# Patient Record
Sex: FEMALE | Race: WHITE | Employment: OTHER | URBAN - METROPOLITAN AREA
[De-identification: names, ages, dates, MRNs, and addresses within clinical notes are randomized per-mention and may not be internally consistent; named-entity substitution may affect disease eponyms.]

---

## 2018-05-17 NOTE — PERIOP NOTES
Called patient with directions to [facility] and our phone number. Patient states no blood transfusions in the last 90 days. She agreed to change the 5/21 PAT appointment to 5/22/18.

## 2018-05-22 ENCOUNTER — HOSPITAL ENCOUNTER (OUTPATIENT)
Dept: SURGERY | Age: 73
Discharge: HOME | DRG: 554 | End: 2018-05-22
Admitting: ORTHOPAEDIC SURGERY
Payer: MEDICARE

## 2018-05-22 VITALS — WEIGHT: 154 LBS | HEIGHT: 61 IN | BODY MASS INDEX: 29.07 KG/M2

## 2018-05-22 LAB
ABO + RH BLD: NORMAL
ALBUMIN SERPL-MCNC: 3.6 G/DL (ref 3.5–5)
ALBUMIN/GLOB SERPL: 0.9 {RATIO} (ref 1.1–2.2)
ALP SERPL-CCNC: 93 U/L (ref 45–117)
ALT SERPL-CCNC: 17 U/L (ref 12–78)
ANION GAP SERPL CALC-SCNC: 4 MMOL/L (ref 5–15)
APPEARANCE UR: CLEAR
AST SERPL-CCNC: 11 U/L (ref 15–37)
BACTERIA URNS QL MICRO: NEGATIVE /HPF
BILIRUB SERPL-MCNC: 0.4 MG/DL (ref 0.2–1)
BILIRUB UR QL: NEGATIVE
BLOOD GROUP ANTIBODIES SERPL: NORMAL
BUN SERPL-MCNC: 24 MG/DL (ref 6–20)
BUN/CREAT SERPL: 16 (ref 12–20)
CALCIUM SERPL-MCNC: 9.1 MG/DL (ref 8.5–10.1)
CHLORIDE SERPL-SCNC: 105 MMOL/L (ref 97–108)
CO2 SERPL-SCNC: 29 MMOL/L (ref 21–32)
COLOR UR: NORMAL
CREAT SERPL-MCNC: 1.46 MG/DL (ref 0.55–1.02)
EPITH CASTS URNS QL MICRO: NORMAL /LPF
ERYTHROCYTE [DISTWIDTH] IN BLOOD BY AUTOMATED COUNT: 13.4 % (ref 11.5–14.5)
EST. AVERAGE GLUCOSE BLD GHB EST-MCNC: 120 MG/DL
GLOBULIN SER CALC-MCNC: 4.2 G/DL (ref 2–4)
GLUCOSE SERPL-MCNC: 85 MG/DL (ref 65–100)
GLUCOSE UR STRIP.AUTO-MCNC: NEGATIVE MG/DL
HBA1C MFR BLD: 5.8 % (ref 4.2–6.3)
HCT VFR BLD AUTO: 38 % (ref 35–47)
HGB BLD-MCNC: 11.9 G/DL (ref 11.5–16)
HGB UR QL STRIP: NEGATIVE
HYALINE CASTS URNS QL MICRO: NORMAL /LPF (ref 0–5)
INR PPP: 1 (ref 0.9–1.1)
KETONES UR QL STRIP.AUTO: NEGATIVE MG/DL
LEUKOCYTE ESTERASE UR QL STRIP.AUTO: NEGATIVE
MCH RBC QN AUTO: 30.8 PG (ref 26–34)
MCHC RBC AUTO-ENTMCNC: 31.3 G/DL (ref 30–36.5)
MCV RBC AUTO: 98.4 FL (ref 80–99)
NITRITE UR QL STRIP.AUTO: NEGATIVE
NRBC # BLD: 0 K/UL (ref 0–0.01)
NRBC BLD-RTO: 0 PER 100 WBC
PH UR STRIP: 5 [PH] (ref 5–8)
PLATELET # BLD AUTO: 196 K/UL (ref 150–400)
PMV BLD AUTO: 9.3 FL (ref 8.9–12.9)
POTASSIUM SERPL-SCNC: 4.5 MMOL/L (ref 3.5–5.1)
PROT SERPL-MCNC: 7.8 G/DL (ref 6.4–8.2)
PROT UR STRIP-MCNC: NEGATIVE MG/DL
PROTHROMBIN TIME: 9.8 SEC (ref 9–11.1)
RBC # BLD AUTO: 3.86 M/UL (ref 3.8–5.2)
RBC #/AREA URNS HPF: NORMAL /HPF (ref 0–5)
SODIUM SERPL-SCNC: 138 MMOL/L (ref 136–145)
SP GR UR REFRACTOMETRY: 1.02 (ref 1–1.03)
SPECIMEN EXP DATE BLD: NORMAL
UA: UC IF INDICATED,UAUC: NORMAL
UROBILINOGEN UR QL STRIP.AUTO: 0.2 EU/DL (ref 0.2–1)
WBC # BLD AUTO: 6.9 K/UL (ref 3.6–11)
WBC URNS QL MICRO: NORMAL /HPF (ref 0–4)

## 2018-05-22 PROCEDURE — 85610 PROTHROMBIN TIME: CPT | Performed by: ORTHOPAEDIC SURGERY

## 2018-05-22 PROCEDURE — 83036 HEMOGLOBIN GLYCOSYLATED A1C: CPT | Performed by: ORTHOPAEDIC SURGERY

## 2018-05-22 PROCEDURE — 85027 COMPLETE CBC AUTOMATED: CPT | Performed by: ORTHOPAEDIC SURGERY

## 2018-05-22 PROCEDURE — 80053 COMPREHEN METABOLIC PANEL: CPT | Performed by: ORTHOPAEDIC SURGERY

## 2018-05-22 PROCEDURE — 81001 URINALYSIS AUTO W/SCOPE: CPT | Performed by: ORTHOPAEDIC SURGERY

## 2018-05-22 PROCEDURE — 36415 COLL VENOUS BLD VENIPUNCTURE: CPT | Performed by: ORTHOPAEDIC SURGERY

## 2018-05-22 PROCEDURE — 86900 BLOOD TYPING SEROLOGIC ABO: CPT | Performed by: ORTHOPAEDIC SURGERY

## 2018-05-22 RX ORDER — TRAMADOL HYDROCHLORIDE 50 MG/1
50 TABLET ORAL 2 TIMES DAILY
COMMUNITY
End: 2018-06-02

## 2018-05-22 RX ORDER — METFORMIN HYDROCHLORIDE 500 MG/1
500 TABLET ORAL
Status: ON HOLD | COMMUNITY
End: 2018-07-12

## 2018-05-22 RX ORDER — SERTRALINE HYDROCHLORIDE 100 MG/1
50 TABLET, FILM COATED ORAL 2 TIMES DAILY
COMMUNITY

## 2018-05-22 RX ORDER — HYDROCODONE BITARTRATE AND ACETAMINOPHEN 7.5; 325 MG/1; MG/1
1 TABLET ORAL
COMMUNITY
End: 2018-07-15

## 2018-05-22 RX ORDER — SIMVASTATIN 20 MG/1
20 TABLET, FILM COATED ORAL
COMMUNITY

## 2018-05-22 RX ORDER — MELOXICAM 15 MG/1
15 TABLET ORAL DAILY
Status: ON HOLD | COMMUNITY
End: 2018-07-12

## 2018-05-22 RX ORDER — OMEPRAZOLE 40 MG/1
40 CAPSULE, DELAYED RELEASE ORAL
COMMUNITY

## 2018-05-22 RX ORDER — LISINOPRIL AND HYDROCHLOROTHIAZIDE 10; 12.5 MG/1; MG/1
1 TABLET ORAL DAILY
COMMUNITY

## 2018-05-22 NOTE — PERIOP NOTES
Showed Dr Lenin Shanks pts EKG from 03/20/18. Told him that I didn't have a comparison, wanted to see if he wanted me to do another EKG today. He said with pts comorbities, he wants pt to go to PCP and get a medical clearance. Will call office to set up appt for pt.

## 2018-05-22 NOTE — PERIOP NOTES
The only place that pt thought that she might have had a previous EKG was [facility], just got fax from medical records there and no EKG's were found.

## 2018-05-22 NOTE — PERIOP NOTES
Once all of pts labs are resulted, will have PAT RN fax them over to PCP for their review prior to medical clearance appt. Making sure to fax the EKG from [facility] for PCP to evaluate.

## 2018-05-22 NOTE — PERIOP NOTES
[facility]  Ambulatory Surgery Unit  Pre-operative Instructions    Surgery/Procedure Date  06/01/18            Tentative Arrival Time 1015      1. On the day of your surgery/procedure, please report to the Ambulatory Surgery Unit Registration Desk and sign in at your designated time. The Ambulatory Surgery Unit is located in [facility] on the [facility] side of the [facility] across from the [facility]. Please have all of your health insurance cards and a photo ID. 2. You must have someone with you to drive you home, as you should not drive a car for 24 hours following anesthesia. Please make arrangements for a responsible adult friend or family member to stay with you for at least the first 24 hours after your surgery. 3. Do not have anything to eat or drink (including water, gum, mints, coffee, juice) after midnight     . This may not apply to medications prescribed by your physician. (Please note below the special instructions with medications to take the morning of surgery, if applicable.)    4. We recommend you do not drink any alcoholic beverages for 24 hours before and after your surgery. 5. Stop all Aspirin and non-steroidal anti-inflammatory drugs (i.e. Advil, Aleve) as directed by your surgeon's office. Stop all vitamins and herbal supplements seven days prior to your surgery. If you are currently taking Plavix, Coumadin, or other blood-thinning agents, contact your surgeon for instructions. 6. In an effort to help prevent surgical site infection, we ask that you shower with an anti-bacterial soap (i.e. Dial or Safeguard) for 3 days prior to and on the morning of surgery, using a fresh towel after each shower. Do not apply any lotions, powders or deodorants after the shower on the day of your procedure. If applicable, please do not shave the operative site for 48 hours prior to surgery. 7. Wear comfortable clothes. Wear glasses instead of contacts. Do not bring any money or jewelry. Do not wear make-up, particularly mascara, the morning of your surgery. Do not wear nail polish, particularly if you are having foot /hand surgery. Wear your hair loose or down, no ponytails, buns, becca pins or clips. All body piercings must be removed. 8. You should understand that if you do not follow these instructions your surgery may be cancelled. If your physical condition changes (i.e. fever, cold or flu) please contact your surgeon as soon as possible. 9. It is important that you be on time. If a situation occurs where you may be late, or if you have any questions or problems, please call [phone].    10. Your surgery time may be subject to change. You will receive a phone call the day prior to surgery to confirm your arrival time. 11. Pediatric patients: please bring a change of clothes, diapers, bottle/sippy cup, pacifier, etc.      Special Instructions:    MEDICATIONS TO TAKE THE MORNING OF SURGERY WITH A SIP OF WATER:  Take all medications as usual except for diabetic medication. Also check with Dr Aj Simeon about whether or not you need to stop meloxicam.      I understand a pre-operative phone call will be made to verify my surgery time. In the event that I am not available, I give permission for a message to be left on my answering service and/or with another person?       {yes  [phone] or [phone]         ___________________      ___________________  _________  (Signature of Patient)          (Witness)                              (Date and Time)

## 2018-05-23 LAB
BACTERIA SPEC CULT: NORMAL
BACTERIA SPEC CULT: NORMAL
SERVICE CMNT-IMP: NORMAL

## 2018-05-23 NOTE — PERIOP NOTES
All PAT results, including outside EKG faxed to Dr. Leopoldo Riggs, Pt's PCP. Transmission confirmation received. Pt has PCP visit for clearance on 5/29/18.

## 2018-05-24 NOTE — PERIOP NOTES
Andrew Godinez in Dr. Treva Garrido' office notified that Dr. Audra Anguiano in anesthesia has requested medical clearance. PCP appointment 5/29. All PAT results and outside EKG faxed to her office. Call to PCP, spoke with Bertha to confirm receipt of fax. She requested it be refaxed. Resent, transmission confirmation received. Bertha stated she received it and will attach it to the pt's chart.

## 2018-05-31 ENCOUNTER — ANESTHESIA EVENT (OUTPATIENT)
Dept: SURGERY | Age: 73
DRG: 483 | End: 2018-05-31
Payer: MEDICARE

## 2018-05-31 PROBLEM — M12.811 ROTATOR CUFF ARTHROPATHY, RIGHT: Status: ACTIVE | Noted: 2018-05-31

## 2018-05-31 NOTE — H&P
PRE- OP History and Physical                             Subjective:     Patient is a 74 y.o. female presented with a history of right shoulder grade 4 glenohumeral osteoarthritis, right shoulder more painful than the left. Rates the pain at its worse as a 10+/10 on the right, 7/10 on the left. Sharp stabbing and achy pain located mostly deltoid. She has been having disabling pain over the past 2 years that has not been improved with nonsurgical treatment. She has had no recent injury or trauma. She has tried cortisone shots in the past with no improvement, but no shots in the last 6 months. Pain worse with movement, use, and at night. Better with rest.  Of note she has diabetes well controlled with metformin. .  The patient's condition has been resistant to non-operative treatment and is being admitted for surgical management of this condition. Patient Active Problem List    Diagnosis Date Noted    Osteoarthritis of right shoulder 06/01/2018    Rotator cuff arthropathy, right 05/31/2018     Past Medical History:   Diagnosis Date    Arthritis     Diabetes ([address]     Gall stones     h/o    GERD (gastroesophageal reflux disease)     High cholesterol     Hypertension     Insomnia     Migraines     Nausea & vomiting     Psychiatric disorder     depression      Past Surgical History:   Procedure Laterality Date    HX APPENDECTOMY      HX CHOLECYSTECTOMY      HX HERNIA REPAIR  08/2017, 3/21/18    ventral hernia with mesh    HX HYSTERECTOMY      HX KNEE REPLACEMENT      bilateral    HX OOPHORECTOMY      HX ORTHOPAEDIC      rt ankle    HX ORTHOPAEDIC      joints scrapped bilat. hands due to arthritis    HX UROLOGICAL      rectal and bladder tack      Prior to Admission medications    Medication Sig Start Date End Date Taking? Authorizing Provider   lisinopril-hydroCHLOROthiazide (PRINZIDE, ZESTORETIC) 10-12.5 mg per tablet Take 1 Tab by mouth daily. Yes Historical Provider   meloxicam (MOBIC) 15 mg tablet Take 15 mg by mouth daily. Yes Historical Provider   metFORMIN (GLUCOPHAGE) 500 mg tablet Take 500 mg by mouth daily (with breakfast). Yes Historical Provider   omeprazole (PRILOSEC) 40 mg capsule Take 40 mg by mouth as needed. Yes Historical Provider   sertraline (ZOLOFT) 100 mg tablet Take 50 mg by mouth two (2) times a day. Yes Historical Provider   simvastatin (ZOCOR) 20 mg tablet Take 20 mg by mouth nightly. Yes Historical Provider   traMADol (ULTRAM) 50 mg tablet Take 50 mg by mouth two (2) times a day. Yes Historical Provider   HYDROcodone-acetaminophen (NORCO) 7.5-325 mg per tablet Take 1 Tab by mouth every six (6) hours as needed for Pain. Historical Provider     No Known Allergies   Social History   Substance Use Topics    Smoking status: Never Smoker    Smokeless tobacco: Never Used    Alcohol use No      History reviewed. No pertinent family history. Review of Systems  A comprehensive review of systems was negative except for that written in the HPI. Objective:     Patient Vitals for the past 8 hrs:   BP Temp Pulse Resp SpO2 Height Weight   06/01/18 0948 (!) 125/98 97.6 °F (36.4 °C) 96 18 97 % 5' 1\" (1.549 m) 70.3 kg (155 lb)     Visit Vitals    BP (!) 125/98 (BP 1 Location: Right arm, BP Patient Position: At rest)    Pulse 96    Temp 97.6 °F (36.4 °C)    Resp 18    Ht 5' 1\" (1.549 m)    Wt 70.3 kg (155 lb)    SpO2 97%    BMI 29.29 kg/m2     General:  Alert, cooperative, no distress, appears stated age. Head:  Normocephalic, without obvious abnormality, atraumatic. Eyes:  Conjunctivae/corneas clear. PERRL, EOMs intact. Ears:  Normal TMs and external ear canals both ears. Nose: Nares normal. Septum midline. Mucosa normal. No drainage or sinus tenderness.    Throat: Lips, mucosa, and tongue normal. Teeth and gums normal.   Neck: Supple, symmetrical, trachea midline, no adenopathy, thyroid: no enlargement/tenderness/nodules, no carotid bruit and no JVD. Back:   Symmetric, no curvature. ROM normal. No CVA tenderness. Lungs:   Clear to auscultation bilaterally. Chest wall:  No tenderness or deformity. Heart:  Regular rate and rhythm, S1, S2, no murmur, click, rub or gallop. Abdomen:   Soft, non-tender. Bowel sounds normal. No masses,  No organomegaly. Extremities: Extremities normal except   Range of motion 0-80, painful arc, positive drop-arm sign, positive Moreau. , atraumatic, no cyanosis or edema. Pulses: 2+ and symmetric all extremities. Skin: Skin color, texture, turgor normal. No rashes or lesions   Lymph nodes: Cervical, supraclavicular, and axillary nodes normal.   Neurologic: CNII-XII intact. Neurovascular exam intact in distal extremities        Imaging Review  I independently reviewed and interpreted previous AP Grashey view and outlet view of right shoulder from 3/6/18. The oblique view shows right grade 4 glenohumeral arthritis , and the true AP view on the left shows grade 4 glenohumeral osteoarthritis .       I independently reviewed and interpreted both the CT scan and report of right shoulder which shows no excessive retroversion, superior migration consistent with rotator cuff arthropathy. Assessment:     Active Problems:    Rotator cuff arthropathy, right (5/31/2018)      Osteoarthritis of right shoulder (6/1/2018)        Plan:   Patient has unimproving right shoulder pain from right rotator cuff arthropathy. I reviewed old notes, old x-rays, CT scan, findings, impression, treatment options, and plan with the patient and her . Treatment options discussed to include no intervention, prescription strength oral anti-inflammatories, injections, therapy, and surgery. I discussed indications, risks, benefits, and recovery for reverse total shoulder arthroplasty.  Operative and non-operative treatments have been discussed with the patient including risks and benefits of each. After consideration of risks, benefits limitations to the consented procedures and alternative options for treatment, the patient has consented to surgical interventions. Questions were answered and Pre-op teaching was completed.       SEAN Rodriguez

## 2018-06-01 ENCOUNTER — ANESTHESIA (OUTPATIENT)
Dept: SURGERY | Age: 73
DRG: 483 | End: 2018-06-01
Payer: MEDICARE

## 2018-06-01 ENCOUNTER — HOSPITAL ENCOUNTER (INPATIENT)
Age: 73
LOS: 1 days | Discharge: HOME | DRG: 483 | End: 2018-06-02
Attending: ORTHOPAEDIC SURGERY | Admitting: ORTHOPAEDIC SURGERY
Payer: MEDICARE

## 2018-06-01 PROBLEM — M19.011 OSTEOARTHRITIS OF RIGHT SHOULDER: Status: ACTIVE | Noted: 2018-06-01

## 2018-06-01 LAB
GLUCOSE BLD STRIP.AUTO-MCNC: 113 MG/DL (ref 65–100)
GLUCOSE BLD STRIP.AUTO-MCNC: 151 MG/DL (ref 65–100)
SERVICE CMNT-IMP: ABNORMAL
SERVICE CMNT-IMP: ABNORMAL

## 2018-06-01 PROCEDURE — 77030019908 HC STETH ESOPH SIMS -A: Performed by: NURSE ANESTHETIST, CERTIFIED REGISTERED

## 2018-06-01 PROCEDURE — 74011000250 HC RX REV CODE- 250: Performed by: PHYSICIAN ASSISTANT

## 2018-06-01 PROCEDURE — 74011250636 HC RX REV CODE- 250/636

## 2018-06-01 PROCEDURE — 77030013079 HC BLNKT BAIR HGGR 3M -A: Performed by: NURSE ANESTHETIST, CERTIFIED REGISTERED

## 2018-06-01 PROCEDURE — 77030010781 HC BOWL MX BN ENCR -C: Performed by: ORTHOPAEDIC SURGERY

## 2018-06-01 PROCEDURE — 74011000258 HC RX REV CODE- 258

## 2018-06-01 PROCEDURE — 77030002912 HC SUT ETHBND J&J -A: Performed by: ORTHOPAEDIC SURGERY

## 2018-06-01 PROCEDURE — 77030020788: Performed by: ORTHOPAEDIC SURGERY

## 2018-06-01 PROCEDURE — 77030021352 HC CBL LD SYS DISP COVD -B: Performed by: ORTHOPAEDIC SURGERY

## 2018-06-01 PROCEDURE — 77030010516 HC APPL HEMA CLP TELE -B: Performed by: ORTHOPAEDIC SURGERY

## 2018-06-01 PROCEDURE — 77030021668 HC NEB PREFIL KT VYRM -A: Performed by: ORTHOPAEDIC SURGERY

## 2018-06-01 PROCEDURE — 74011250637 HC RX REV CODE- 250/637: Performed by: PHYSICIAN ASSISTANT

## 2018-06-01 PROCEDURE — C1776 JOINT DEVICE (IMPLANTABLE): HCPCS | Performed by: ORTHOPAEDIC SURGERY

## 2018-06-01 PROCEDURE — 76030000020 HC AMB SURG 1.5 TO 2 HR INTENSV-TIER 1: Performed by: ORTHOPAEDIC SURGERY

## 2018-06-01 PROCEDURE — 65270000029 HC RM PRIVATE

## 2018-06-01 PROCEDURE — 77030002922 HC SUT FBRWRE ARTH -B: Performed by: ORTHOPAEDIC SURGERY

## 2018-06-01 PROCEDURE — 74011250636 HC RX REV CODE- 250/636: Performed by: ANESTHESIOLOGY

## 2018-06-01 PROCEDURE — 74011250636 HC RX REV CODE- 250/636: Performed by: ORTHOPAEDIC SURGERY

## 2018-06-01 PROCEDURE — 74011000250 HC RX REV CODE- 250: Performed by: ORTHOPAEDIC SURGERY

## 2018-06-01 PROCEDURE — 77030018836 HC SOL IRR NACL ICUM -A: Performed by: ORTHOPAEDIC SURGERY

## 2018-06-01 PROCEDURE — 76210000036 HC AMBSU PH I REC 1.5 TO 2 HR: Performed by: ORTHOPAEDIC SURGERY

## 2018-06-01 PROCEDURE — 77030013708 HC HNDPC SUC IRR PULS STRY –B: Performed by: ORTHOPAEDIC SURGERY

## 2018-06-01 PROCEDURE — 0RRJ00Z REPLACEMENT OF RIGHT SHOULDER JOINT WITH REVERSE BALL AND SOCKET SYNTHETIC SUBSTITUTE, OPEN APPROACH: ICD-10-PCS | Performed by: ORTHOPAEDIC SURGERY

## 2018-06-01 PROCEDURE — 74011000250 HC RX REV CODE- 250

## 2018-06-01 PROCEDURE — 76060000063 HC AMB SURG ANES 1.5 TO 2 HR: Performed by: ORTHOPAEDIC SURGERY

## 2018-06-01 PROCEDURE — 77030006835 HC BLD SAW SAG STRY -B: Performed by: ORTHOPAEDIC SURGERY

## 2018-06-01 PROCEDURE — 77030008467 HC STPLR SKN COVD -B: Performed by: ORTHOPAEDIC SURGERY

## 2018-06-01 PROCEDURE — 74011000250 HC RX REV CODE- 250: Performed by: ANESTHESIOLOGY

## 2018-06-01 PROCEDURE — 77030031139 HC SUT VCRL2 J&J -A: Performed by: ORTHOPAEDIC SURGERY

## 2018-06-01 PROCEDURE — 77030026438 HC STYL ET INTUB CARD -A: Performed by: NURSE ANESTHETIST, CERTIFIED REGISTERED

## 2018-06-01 PROCEDURE — C1713 ANCHOR/SCREW BN/BN,TIS/BN: HCPCS | Performed by: ORTHOPAEDIC SURGERY

## 2018-06-01 PROCEDURE — 77030018846 HC SOL IRR STRL H20 ICUM -A: Performed by: ORTHOPAEDIC SURGERY

## 2018-06-01 PROCEDURE — 77030011640 HC PAD GRND REM COVD -A: Performed by: ORTHOPAEDIC SURGERY

## 2018-06-01 PROCEDURE — 82962 GLUCOSE BLOOD TEST: CPT

## 2018-06-01 PROCEDURE — 77030008684 HC TU ET CUF COVD -B: Performed by: NURSE ANESTHETIST, CERTIFIED REGISTERED

## 2018-06-01 PROCEDURE — 74011250636 HC RX REV CODE- 250/636: Performed by: PHYSICIAN ASSISTANT

## 2018-06-01 PROCEDURE — 77030020255 HC SOL INJ LR 1000ML BG: Performed by: ORTHOPAEDIC SURGERY

## 2018-06-01 PROCEDURE — 77030018673: Performed by: ORTHOPAEDIC SURGERY

## 2018-06-01 DEVICE — COMPONENT SHLDR REVERSED RSP: Type: IMPLANTABLE DEVICE | Status: FUNCTIONAL

## 2018-06-01 DEVICE — SCREW, RETAINING GLENOID HEAD RSP
Type: IMPLANTABLE DEVICE | Site: SHOULDER | Status: FUNCTIONAL
Brand: DJO SURGICAL

## 2018-06-01 DEVICE — COBALT G-HV BONE CEMENT 40GM
Type: IMPLANTABLE DEVICE | Site: SHOULDER | Status: FUNCTIONAL
Brand: DJO SURGICAL

## 2018-06-01 DEVICE — BASEPLATE, GLENOID HA-COAT, RSP, 6.5MM X 30MM
Type: IMPLANTABLE DEVICE | Site: SHOULDER | Status: FUNCTIONAL
Brand: DJO SURGICAL

## 2018-06-01 DEVICE — GLENOID, HEAD W/RETAINING SCREW, RSP, 32MM/-4MM
Type: IMPLANTABLE DEVICE | Site: SHOULDER | Status: FUNCTIONAL
Brand: DJO SURGICAL

## 2018-06-01 DEVICE — SCREW, LOCKING BONE, RSP, 5X26
Type: IMPLANTABLE DEVICE | Site: SHOULDER | Status: FUNCTIONAL
Brand: DJO SURGICAL

## 2018-06-01 DEVICE — PLUG, CEMENT SZ. 10.0
Type: IMPLANTABLE DEVICE | Site: SHOULDER | Status: FUNCTIONAL
Brand: DJO SURGICAL

## 2018-06-01 DEVICE — SCREW, LOCKING BONE, RSP, 5X18
Type: IMPLANTABLE DEVICE | Site: SHOULDER | Status: FUNCTIONAL
Brand: DJO SURGICAL

## 2018-06-01 RX ORDER — MORPHINE SULFATE 10 MG/ML
2 INJECTION, SOLUTION INTRAMUSCULAR; INTRAVENOUS
Status: DISCONTINUED | OUTPATIENT
Start: 2018-06-01 | End: 2018-06-01 | Stop reason: HOSPADM

## 2018-06-01 RX ORDER — GLYCOPYRROLATE 0.2 MG/ML
INJECTION INTRAMUSCULAR; INTRAVENOUS AS NEEDED
Status: DISCONTINUED | OUTPATIENT
Start: 2018-06-01 | End: 2018-06-01 | Stop reason: HOSPADM

## 2018-06-01 RX ORDER — ROPIVACAINE HYDROCHLORIDE 5 MG/ML
INJECTION, SOLUTION EPIDURAL; INFILTRATION; PERINEURAL
Status: DISCONTINUED
Start: 2018-06-01 | End: 2018-06-01

## 2018-06-01 RX ORDER — LIDOCAINE HYDROCHLORIDE 20 MG/ML
INJECTION, SOLUTION EPIDURAL; INFILTRATION; INTRACAUDAL; PERINEURAL AS NEEDED
Status: DISCONTINUED | OUTPATIENT
Start: 2018-06-01 | End: 2018-06-01 | Stop reason: HOSPADM

## 2018-06-01 RX ORDER — ROCURONIUM BROMIDE 10 MG/ML
INJECTION, SOLUTION INTRAVENOUS AS NEEDED
Status: DISCONTINUED | OUTPATIENT
Start: 2018-06-01 | End: 2018-06-01 | Stop reason: HOSPADM

## 2018-06-01 RX ORDER — SODIUM CHLORIDE 0.9 % (FLUSH) 0.9 %
5-10 SYRINGE (ML) INJECTION AS NEEDED
Status: DISCONTINUED | OUTPATIENT
Start: 2018-06-01 | End: 2018-06-01 | Stop reason: HOSPADM

## 2018-06-01 RX ORDER — SODIUM CHLORIDE 0.9 % (FLUSH) 0.9 %
5-10 SYRINGE (ML) INJECTION EVERY 8 HOURS
Status: DISCONTINUED | OUTPATIENT
Start: 2018-06-01 | End: 2018-06-02 | Stop reason: HOSPADM

## 2018-06-01 RX ORDER — SODIUM CHLORIDE, SODIUM LACTATE, POTASSIUM CHLORIDE, CALCIUM CHLORIDE 600; 310; 30; 20 MG/100ML; MG/100ML; MG/100ML; MG/100ML
25 INJECTION, SOLUTION INTRAVENOUS CONTINUOUS
Status: DISCONTINUED | OUTPATIENT
Start: 2018-06-01 | End: 2018-06-01 | Stop reason: HOSPADM

## 2018-06-01 RX ORDER — FENTANYL CITRATE 50 UG/ML
INJECTION, SOLUTION INTRAMUSCULAR; INTRAVENOUS AS NEEDED
Status: DISCONTINUED | OUTPATIENT
Start: 2018-06-01 | End: 2018-06-01 | Stop reason: HOSPADM

## 2018-06-01 RX ORDER — SODIUM CHLORIDE 0.9 % (FLUSH) 0.9 %
5-10 SYRINGE (ML) INJECTION AS NEEDED
Status: DISCONTINUED | OUTPATIENT
Start: 2018-06-01 | End: 2018-06-02 | Stop reason: HOSPADM

## 2018-06-01 RX ORDER — OXYCODONE HYDROCHLORIDE 5 MG/1
5 TABLET ORAL
Status: DISCONTINUED | OUTPATIENT
Start: 2018-06-01 | End: 2018-06-02 | Stop reason: HOSPADM

## 2018-06-01 RX ORDER — EPHEDRINE SULFATE 50 MG/ML
INJECTION, SOLUTION INTRAVENOUS AS NEEDED
Status: DISCONTINUED | OUTPATIENT
Start: 2018-06-01 | End: 2018-06-01 | Stop reason: HOSPADM

## 2018-06-01 RX ORDER — MIDAZOLAM HYDROCHLORIDE 1 MG/ML
INJECTION, SOLUTION INTRAMUSCULAR; INTRAVENOUS AS NEEDED
Status: DISCONTINUED | OUTPATIENT
Start: 2018-06-01 | End: 2018-06-01 | Stop reason: HOSPADM

## 2018-06-01 RX ORDER — SERTRALINE HYDROCHLORIDE 50 MG/1
50 TABLET, FILM COATED ORAL 2 TIMES DAILY
Status: DISCONTINUED | OUTPATIENT
Start: 2018-06-01 | End: 2018-06-02 | Stop reason: HOSPADM

## 2018-06-01 RX ORDER — SODIUM CHLORIDE 900 MG/100ML
INJECTION INTRAVENOUS
Status: COMPLETED
Start: 2018-06-01 | End: 2018-06-01

## 2018-06-01 RX ORDER — DIPHENHYDRAMINE HYDROCHLORIDE 50 MG/ML
12.5 INJECTION, SOLUTION INTRAMUSCULAR; INTRAVENOUS AS NEEDED
Status: DISCONTINUED | OUTPATIENT
Start: 2018-06-01 | End: 2018-06-01 | Stop reason: HOSPADM

## 2018-06-01 RX ORDER — NALOXONE HYDROCHLORIDE 0.4 MG/ML
0.4 INJECTION, SOLUTION INTRAMUSCULAR; INTRAVENOUS; SUBCUTANEOUS AS NEEDED
Status: DISCONTINUED | OUTPATIENT
Start: 2018-06-01 | End: 2018-06-02 | Stop reason: HOSPADM

## 2018-06-01 RX ORDER — AMOXICILLIN 250 MG
1 CAPSULE ORAL 2 TIMES DAILY
Status: DISCONTINUED | OUTPATIENT
Start: 2018-06-01 | End: 2018-06-02 | Stop reason: HOSPADM

## 2018-06-01 RX ORDER — OXYCODONE AND ACETAMINOPHEN 5; 325 MG/1; MG/1
1 TABLET ORAL
Status: DISCONTINUED | OUTPATIENT
Start: 2018-06-01 | End: 2018-06-01 | Stop reason: HOSPADM

## 2018-06-01 RX ORDER — FAMOTIDINE 20 MG/1
20 TABLET, FILM COATED ORAL 2 TIMES DAILY
Status: DISCONTINUED | OUTPATIENT
Start: 2018-06-01 | End: 2018-06-01

## 2018-06-01 RX ORDER — PANTOPRAZOLE SODIUM 40 MG/1
40 TABLET, DELAYED RELEASE ORAL
Status: DISCONTINUED | OUTPATIENT
Start: 2018-06-02 | End: 2018-06-02 | Stop reason: HOSPADM

## 2018-06-01 RX ORDER — OXYCODONE HYDROCHLORIDE 5 MG/1
10 TABLET ORAL
Status: DISCONTINUED | OUTPATIENT
Start: 2018-06-01 | End: 2018-06-02 | Stop reason: HOSPADM

## 2018-06-01 RX ORDER — DEXAMETHASONE SODIUM PHOSPHATE 4 MG/ML
INJECTION, SOLUTION INTRA-ARTICULAR; INTRALESIONAL; INTRAMUSCULAR; INTRAVENOUS; SOFT TISSUE AS NEEDED
Status: DISCONTINUED | OUTPATIENT
Start: 2018-06-01 | End: 2018-06-01 | Stop reason: HOSPADM

## 2018-06-01 RX ORDER — CEFAZOLIN SODIUM/WATER 2 G/20 ML
2 SYRINGE (ML) INTRAVENOUS ONCE
Status: COMPLETED | OUTPATIENT
Start: 2018-06-01 | End: 2018-06-01

## 2018-06-01 RX ORDER — PROPOFOL 10 MG/ML
INJECTION, EMULSION INTRAVENOUS AS NEEDED
Status: DISCONTINUED | OUTPATIENT
Start: 2018-06-01 | End: 2018-06-01 | Stop reason: HOSPADM

## 2018-06-01 RX ORDER — SODIUM CHLORIDE 0.9 % (FLUSH) 0.9 %
5-10 SYRINGE (ML) INJECTION EVERY 8 HOURS
Status: DISCONTINUED | OUTPATIENT
Start: 2018-06-01 | End: 2018-06-01 | Stop reason: HOSPADM

## 2018-06-01 RX ORDER — ACETAMINOPHEN 500 MG
500 TABLET ORAL EVERY 6 HOURS
Status: DISCONTINUED | OUTPATIENT
Start: 2018-06-01 | End: 2018-06-02 | Stop reason: HOSPADM

## 2018-06-01 RX ORDER — METFORMIN HYDROCHLORIDE 500 MG/1
500 TABLET ORAL
Status: DISCONTINUED | OUTPATIENT
Start: 2018-06-02 | End: 2018-06-02 | Stop reason: HOSPADM

## 2018-06-01 RX ORDER — ONDANSETRON 2 MG/ML
4 INJECTION INTRAMUSCULAR; INTRAVENOUS
Status: DISCONTINUED | OUTPATIENT
Start: 2018-06-01 | End: 2018-06-02 | Stop reason: HOSPADM

## 2018-06-01 RX ORDER — DIPHENHYDRAMINE HYDROCHLORIDE 50 MG/ML
12.5 INJECTION, SOLUTION INTRAMUSCULAR; INTRAVENOUS
Status: DISCONTINUED | OUTPATIENT
Start: 2018-06-01 | End: 2018-06-02 | Stop reason: HOSPADM

## 2018-06-01 RX ORDER — SODIUM CHLORIDE 9 MG/ML
100 INJECTION, SOLUTION INTRAVENOUS CONTINUOUS
Status: DISCONTINUED | OUTPATIENT
Start: 2018-06-01 | End: 2018-06-02 | Stop reason: HOSPADM

## 2018-06-01 RX ORDER — NEOSTIGMINE METHYLSULFATE 1 MG/ML
INJECTION INTRAVENOUS AS NEEDED
Status: DISCONTINUED | OUTPATIENT
Start: 2018-06-01 | End: 2018-06-01 | Stop reason: HOSPADM

## 2018-06-01 RX ORDER — ONDANSETRON 2 MG/ML
INJECTION INTRAMUSCULAR; INTRAVENOUS AS NEEDED
Status: DISCONTINUED | OUTPATIENT
Start: 2018-06-01 | End: 2018-06-01 | Stop reason: HOSPADM

## 2018-06-01 RX ORDER — SUCCINYLCHOLINE CHLORIDE 20 MG/ML
INJECTION INTRAMUSCULAR; INTRAVENOUS AS NEEDED
Status: DISCONTINUED | OUTPATIENT
Start: 2018-06-01 | End: 2018-06-01 | Stop reason: HOSPADM

## 2018-06-01 RX ORDER — MIDAZOLAM HYDROCHLORIDE 1 MG/ML
INJECTION, SOLUTION INTRAMUSCULAR; INTRAVENOUS
Status: COMPLETED
Start: 2018-06-01 | End: 2018-06-01

## 2018-06-01 RX ORDER — PHENYLEPHRINE HCL IN 0.9% NACL 0.4MG/10ML
SYRINGE (ML) INTRAVENOUS AS NEEDED
Status: DISCONTINUED | OUTPATIENT
Start: 2018-06-01 | End: 2018-06-01 | Stop reason: HOSPADM

## 2018-06-01 RX ORDER — TRANEXAMIC ACID 100 MG/ML
1 INJECTION, SOLUTION INTRAVENOUS ONCE
Status: COMPLETED | OUTPATIENT
Start: 2018-06-01 | End: 2018-06-01

## 2018-06-01 RX ORDER — LIDOCAINE HYDROCHLORIDE 10 MG/ML
0.1 INJECTION, SOLUTION EPIDURAL; INFILTRATION; INTRACAUDAL; PERINEURAL AS NEEDED
Status: DISCONTINUED | OUTPATIENT
Start: 2018-06-01 | End: 2018-06-01 | Stop reason: HOSPADM

## 2018-06-01 RX ORDER — CEFAZOLIN SODIUM/WATER 2 G/20 ML
2 SYRINGE (ML) INTRAVENOUS EVERY 8 HOURS
Status: COMPLETED | OUTPATIENT
Start: 2018-06-01 | End: 2018-06-02

## 2018-06-01 RX ORDER — POLYETHYLENE GLYCOL 3350 17 G/17G
17 POWDER, FOR SOLUTION ORAL DAILY
Status: DISCONTINUED | OUTPATIENT
Start: 2018-06-02 | End: 2018-06-02 | Stop reason: HOSPADM

## 2018-06-01 RX ORDER — HYDROMORPHONE HYDROCHLORIDE 2 MG/ML
0.5 INJECTION, SOLUTION INTRAMUSCULAR; INTRAVENOUS; SUBCUTANEOUS
Status: DISCONTINUED | OUTPATIENT
Start: 2018-06-01 | End: 2018-06-02 | Stop reason: HOSPADM

## 2018-06-01 RX ORDER — ACETAMINOPHEN 10 MG/ML
INJECTION, SOLUTION INTRAVENOUS AS NEEDED
Status: DISCONTINUED | OUTPATIENT
Start: 2018-06-01 | End: 2018-06-01 | Stop reason: HOSPADM

## 2018-06-01 RX ORDER — FENTANYL CITRATE 50 UG/ML
25 INJECTION, SOLUTION INTRAMUSCULAR; INTRAVENOUS
Status: DISCONTINUED | OUTPATIENT
Start: 2018-06-01 | End: 2018-06-01 | Stop reason: HOSPADM

## 2018-06-01 RX ORDER — HYDROMORPHONE HYDROCHLORIDE 1 MG/ML
.2-.5 INJECTION, SOLUTION INTRAMUSCULAR; INTRAVENOUS; SUBCUTANEOUS ONCE
Status: DISCONTINUED | OUTPATIENT
Start: 2018-06-01 | End: 2018-06-01 | Stop reason: HOSPADM

## 2018-06-01 RX ADMIN — PROPOFOL 20 MG: 10 INJECTION, EMULSION INTRAVENOUS at 12:15

## 2018-06-01 RX ADMIN — EPHEDRINE SULFATE 5 MG: 50 INJECTION, SOLUTION INTRAVENOUS at 11:40

## 2018-06-01 RX ADMIN — DEXAMETHASONE SODIUM PHOSPHATE 8 MG: 4 INJECTION, SOLUTION INTRA-ARTICULAR; INTRALESIONAL; INTRAMUSCULAR; INTRAVENOUS; SOFT TISSUE at 11:32

## 2018-06-01 RX ADMIN — EPHEDRINE SULFATE 15 MG: 50 INJECTION, SOLUTION INTRAVENOUS at 11:41

## 2018-06-01 RX ADMIN — FENTANYL CITRATE 50 MCG: 50 INJECTION, SOLUTION INTRAMUSCULAR; INTRAVENOUS at 11:32

## 2018-06-01 RX ADMIN — EPHEDRINE SULFATE 15 MG: 50 INJECTION, SOLUTION INTRAVENOUS at 11:43

## 2018-06-01 RX ADMIN — MIDAZOLAM HYDROCHLORIDE 2 MG: 1 INJECTION, SOLUTION INTRAMUSCULAR; INTRAVENOUS at 10:42

## 2018-06-01 RX ADMIN — ROCURONIUM BROMIDE 15 MG: 10 INJECTION, SOLUTION INTRAVENOUS at 11:35

## 2018-06-01 RX ADMIN — SUCCINYLCHOLINE CHLORIDE 140 MG: 20 INJECTION INTRAMUSCULAR; INTRAVENOUS at 11:24

## 2018-06-01 RX ADMIN — Medication 2 G: at 20:08

## 2018-06-01 RX ADMIN — ACETAMINOPHEN 500 MG: 500 TABLET, FILM COATED ORAL at 17:23

## 2018-06-01 RX ADMIN — Medication 120 MCG: at 11:47

## 2018-06-01 RX ADMIN — SODIUM CHLORIDE 100 ML/HR: 900 INJECTION, SOLUTION INTRAVENOUS at 15:41

## 2018-06-01 RX ADMIN — ACETAMINOPHEN 1000 MG: 10 INJECTION, SOLUTION INTRAVENOUS at 11:40

## 2018-06-01 RX ADMIN — Medication 10 ML: at 22:54

## 2018-06-01 RX ADMIN — NEOSTIGMINE METHYLSULFATE 2 MG: 1 INJECTION INTRAVENOUS at 12:47

## 2018-06-01 RX ADMIN — Medication 80 MCG: at 11:53

## 2018-06-01 RX ADMIN — Medication 120 MCG: at 11:57

## 2018-06-01 RX ADMIN — LIDOCAINE HYDROCHLORIDE 0.1 ML: 10 INJECTION, SOLUTION EPIDURAL; INFILTRATION; INTRACAUDAL; PERINEURAL at 09:52

## 2018-06-01 RX ADMIN — SODIUM CHLORIDE, SODIUM LACTATE, POTASSIUM CHLORIDE, AND CALCIUM CHLORIDE 25 ML/HR: 600; 310; 30; 20 INJECTION, SOLUTION INTRAVENOUS at 09:52

## 2018-06-01 RX ADMIN — ROCURONIUM BROMIDE 5 MG: 10 INJECTION, SOLUTION INTRAVENOUS at 11:24

## 2018-06-01 RX ADMIN — PROPOFOL 20 MG: 10 INJECTION, EMULSION INTRAVENOUS at 12:00

## 2018-06-01 RX ADMIN — Medication 40 MCG: at 12:22

## 2018-06-01 RX ADMIN — ONDANSETRON 4 MG: 2 INJECTION INTRAMUSCULAR; INTRAVENOUS at 12:47

## 2018-06-01 RX ADMIN — FENTANYL CITRATE 50 MCG: 50 INJECTION, SOLUTION INTRAMUSCULAR; INTRAVENOUS at 11:24

## 2018-06-01 RX ADMIN — EPHEDRINE SULFATE 5 MG: 50 INJECTION, SOLUTION INTRAVENOUS at 11:57

## 2018-06-01 RX ADMIN — SODIUM CHLORIDE 100 ML: 900 INJECTION, SOLUTION INTRAVENOUS at 14:12

## 2018-06-01 RX ADMIN — ROCURONIUM BROMIDE 10 MG: 10 INJECTION, SOLUTION INTRAVENOUS at 12:15

## 2018-06-01 RX ADMIN — EPHEDRINE SULFATE 10 MG: 50 INJECTION, SOLUTION INTRAVENOUS at 11:54

## 2018-06-01 RX ADMIN — TRANEXAMIC ACID 1000 MG: 100 INJECTION, SOLUTION INTRAVENOUS at 14:12

## 2018-06-01 RX ADMIN — EPHEDRINE SULFATE 10 MG: 50 INJECTION, SOLUTION INTRAVENOUS at 12:24

## 2018-06-01 RX ADMIN — Medication 10 ML: at 17:23

## 2018-06-01 RX ADMIN — Medication 80 MCG: at 11:41

## 2018-06-01 RX ADMIN — Medication 2 G: at 11:28

## 2018-06-01 RX ADMIN — STANDARDIZED SENNA CONCENTRATE AND DOCUSATE SODIUM 1 TABLET: 8.6; 5 TABLET, FILM COATED ORAL at 17:23

## 2018-06-01 RX ADMIN — GLYCOPYRROLATE 0.4 MG: 0.2 INJECTION INTRAMUSCULAR; INTRAVENOUS at 12:47

## 2018-06-01 RX ADMIN — PROPOFOL 50 MG: 10 INJECTION, EMULSION INTRAVENOUS at 11:37

## 2018-06-01 RX ADMIN — TRANEXAMIC ACID 1 G: 100 INJECTION, SOLUTION INTRAVENOUS at 11:27

## 2018-06-01 RX ADMIN — NEOSTIGMINE METHYLSULFATE 1 MG: 1 INJECTION INTRAVENOUS at 12:49

## 2018-06-01 RX ADMIN — OXYCODONE HYDROCHLORIDE 10 MG: 5 TABLET ORAL at 20:15

## 2018-06-01 RX ADMIN — EPHEDRINE SULFATE 10 MG: 50 INJECTION, SOLUTION INTRAVENOUS at 12:23

## 2018-06-01 RX ADMIN — EPHEDRINE SULFATE 5 MG: 50 INJECTION, SOLUTION INTRAVENOUS at 11:47

## 2018-06-01 RX ADMIN — GLYCOPYRROLATE 0.1 MG: 0.2 INJECTION INTRAMUSCULAR; INTRAVENOUS at 12:49

## 2018-06-01 RX ADMIN — ROCURONIUM BROMIDE 10 MG: 10 INJECTION, SOLUTION INTRAVENOUS at 11:51

## 2018-06-01 RX ADMIN — PROPOFOL 150 MG: 10 INJECTION, EMULSION INTRAVENOUS at 11:24

## 2018-06-01 RX ADMIN — SERTRALINE HYDROCHLORIDE 50 MG: 50 TABLET ORAL at 17:23

## 2018-06-01 RX ADMIN — ROCURONIUM BROMIDE 5 MG: 10 INJECTION, SOLUTION INTRAVENOUS at 12:37

## 2018-06-01 RX ADMIN — LIDOCAINE HYDROCHLORIDE 80 MG: 20 INJECTION, SOLUTION EPIDURAL; INFILTRATION; INTRACAUDAL; PERINEURAL at 11:24

## 2018-06-01 RX ADMIN — PROPOFOL 20 MG: 10 INJECTION, EMULSION INTRAVENOUS at 13:02

## 2018-06-01 NOTE — OP NOTES
[address]  OPERATIVE REPORT    Zari Waller  MR#: [id number]  : 1945  ACCOUNT #: [de-identified]   DATE OF SERVICE: 2018    PREOPERATIVE DIAGNOSIS:  Right rotator cuff arthropathy. POSTOPERATIVE DIAGNOSIS:  Right rotator cuff arthropathy. PROCEDURES PERFORMED:  Right shoulder reverse total shoulder arthroplasty including glenoid, axillary nerve neuroplasty, long head biceps tenodesis. SURGEON:  Mj Babcock MD    ASSISTANT:  SEAN Murdock    COMPLICATIONS:  None. ESTIMATED BLOOD LOSS:  60 mL. SPECIMENS REMOVED:  Humeral head. IMPLANTS:  RSP glenoid baseplate with 3 locking screws 32 -4 mm glenosphere, 10 mm cement restrictor, size 8 monoblock stem primary, and a humeral socket insert size 32 +4 . ANESTHESIA:  General endotracheal.    COMPLICATIONS:  None. INDICATIONS:  The patient has rotator cuff arthropathy with severe pain, unrelieved with therapy, causing disabling pain and loss of function who comes in for arthroplasty. This is a complex surgical procedure requiring an assistant and one is used. DESCRIPTION OF PROCEDURE:  The patient was brought to the operating theater and given airway, IV antibiotics, and preoperative interscalene block. She was sat in the beach chair position. Bony prominences padded, right shoulder prepped and draped, and after a timeout, I made a deltopectoral incision, taking the vein laterally, short flexors taken medially. A large pseudocyst typical of cuff arthropathy was dissected off of the proximal humerus and put on the back table. This exposed chronic massive rotator cuff tears. The biceps was tenodesed to the pec major tendon using 2 figure-of-eight #2 FiberWire sutures and excised proximally. The 3 vessels on the bottom of the subscapularis were sutured and then the vessel was divided in between the 2 sutures.   Then the subscapularis was released off the lesser tuberosity and tagged for later dissection. Inferior capsule released off the inferior humeral neck while my assistant externally rotated the arm thus exposing the proximal humerus. Then, with the proximal humerus protected and retracted and exposed, we made a 30-degree retroverted cut, broached to an 8, and used the small and medium-sized concentric pineapple reamers, removed the medial calcar bone. Then we addressed the glenoid. We put a Fukuda posterior inferiorly, retracting the proximal humerus, exposing the glenoid, put traction on the subscapularis, and released the anterior glenohumeral ligaments off the back of the subscapularis down to 5 o'clock. Then we dissected out the axillary nerve starting at the bottom edge of the subscapularis and going all the way to the posterior humeral shaft. With it completely exposed and protected, we were doing aggressive anterior and inferior capsulectomy as well as a 360-degree labral excision and posterior capsular release. This allowed for aggressive posterior subluxation of the humeral head and good exposure of the glenoid. With it thus exposed, we put a drill pin down the anterior part of the vault starting in the center of the glenoid. There was a lot of retroversion, so we had to make up for her false anatomy. We used the small and medium reamers, screwed the glenosphere into place and fixed it with 3 screws superior, inferior, and posterior. We then put on a 32 -4 glenosphere locked in place. We then turned our attention back to the humerus, used the large pineapple reamer, and then put in the trial and found soft tissue tension was a little lax with a standard humeral socket insert. It was then trialled with the +4 socket insert which was much more stable and still excellent range of motion, not too tight. Cemented in the 8 monoblock stem at 30 degrees of retroversion, waited for the cement to dry, and put in the final +4 mm standard humeral socket insert.   Soft tissue tension on the deltoid was good. We copiously irrigated with bacitracin saline, closed in layers, and took the patient to recovery room in stable condition.       MD NOE Quigley /   D: 06/01/2018 15:36     T: 06/01/2018 16:25  JOB #: [id number]

## 2018-06-01 NOTE — BRIEF OP NOTE
BRIEF OPERATIVE NOTE    Date of Procedure: 6/1/2018   Preoperative Diagnosis: OSTEOARTHRITIS RIGHT SHOULDER  Postoperative Diagnosis: OSTEOARTHRITIS RIGHT SHOULDER    Procedure(s):  RIGHT REVERSE SHOULDER ARTHROPLASTY, AXILLARY NERVE NEUROPLASTY, BICEPS TENODESIS  Surgeon(s) and Role: Nikki Lawler MD - Primary         Surgical Assistant: Jorje Quick PA-C  - Assist     Surgical Staff:  Circ-1: Liliam Joe RN  Circ-Relief: Shivani Arechiga RN  Scrub Tech-1: RT Magdalena  Scrub Tech-2: Lacey Marmolejo. Charles  Event Time In   Incision Start 1153   Incision Close 1300     Anesthesia: General   Estimated Blood Loss: 100cc  Specimens: * No specimens in log *   Findings: see above   Complications: none  Implants:   Implant Name Type Inv.  Item Serial No.  Lot No. LRB No. Used Action   CEMENT BNE [place] G-HV 40/20GM -- [id number] - SN/A  CEMENT BNE [place] G-HV 40/20GM -- [id number] N/A [employer] [id number] Right 2 Implanted   RSTRCTR MYRNA BNE BIOABSRB 10MM --  - [id number]  RSTRCTR MYRNA BNE BIOABSRB 10MM --  415- [employer] [id number] Right 1 Implanted   BASEPLT GLENOID REVER 6.5X30MM --  - SN/A  BASEPLT GLENOID REVER 6.5X30MM --  N/A [employer] [id number] Right 1 Implanted   HEAD GLENOID REVERSE SHOULDER --  - SN/A  HEAD GLENOID REVERSE SHOULDER --  N/A [employer] [id number] Right 1 Implanted   SCR LCK GLENOID REVERSE BA --  - SN/A  SCR LCK GLENOID REVERSE BA --  N/A [employer] [id number] Right 1 Implanted   SCR LCK GLENOID REVERSE BA --  - SN/A  SCR LCK GLENOID REVERSE BA --  N/A [employer] [id number] Right 1 Implanted   SCR LCK GLENOID REVERSE BA --  - SN/A  SCR LCK GLENOID REVERSE BA --  N/A [employer] [id number] Right 1 Implanted   SET COMPRESSION SCR GLENOID LO --  - SN/A  SET COMPRESSION SCR GLENOID LO --  N/A [employer] [id number] Right 1 Implanted   RSP Monoblock Stem Size 8, DJO Joint Component  N/A [employer] [id number] Right 1 Implanted   RSP Humeral Socket Insert, Size 32 mm Standard +4, DJO Joint Component   N/A [employer] [id number] Right 1 Implanted

## 2018-06-01 NOTE — PERIOP NOTES
Perico Brookser  1945  [id number]    Situation:  Verbal report given from: CLAY Anthony RN  Procedure: Procedure(s):  RIGHT REVERSE SHOULDER ARTHROPLASTY, AXILLARY NERVE NEUROPLASTY, BICEPS TENODESIS    Background:    Preoperative diagnosis: OSTEOARTHRITIS RIGHT SHOULDER    Postoperative diagnosis: OSTEOARTHRITIS RIGHT SHOULDER    :  Dr. Heather Clayton    Assistant(s): Circ-1: Rene Montes RN  Circ-Relief: Musa Meredith RN  Scrub Tech-1: RT Marti  Scrub Tech-2: Rosemary Charles    Specimens: * No specimens in log *    Assessment:  Intra-procedure medications         Anesthesia gave intra-procedure sedation and medications, see anesthesia flow sheet     Intravenous fluids: LR@ KVO     Vital signs stable

## 2018-06-01 NOTE — ANESTHESIA POSTPROCEDURE EVALUATION
Post-Anesthesia Evaluation and Assessment    Patient: Henny Alexander MRN: [id number]  SSN: [id number]    YOB: 1945  Age: 74 y.o. Sex: female       Cardiovascular Function/Vital Signs  Visit Vitals    /71    Pulse 84    Temp 36.4 °C (97.5 °F)    Resp 21    Ht 5' 1\" (1.549 m)    Wt 70.3 kg (155 lb)    SpO2 94%    BMI 29.29 kg/m2       Patient is status post general, regional anesthesia for Procedure(s):  RIGHT REVERSE SHOULDER ARTHROPLASTY, AXILLARY NERVE NEUROPLASTY, BICEPS TENODESIS. Nausea/Vomiting: None    Postoperative hydration reviewed and adequate. Pain:  Pain Scale 1: Numeric (0 - 10) (06/01/18 1430)  Pain Intensity 1: 0 (06/01/18 1430)   Managed. Pt has interscalene block. Neurological Status:   Neuro (WDL): Exceptions to WDL (06/01/18 1313)  Neuro  RUE Motor Response:  (interscalene block) (06/01/18 1313)   At baseline    Mental Status and Level of Consciousness: Arousable    Pulmonary Status:   O2 Device: Nasal cannula (06/01/18 1430)   Adequate oxygenation and airway patent    Complications related to anesthesia: None    Post-anesthesia assessment completed. No concerns. Planned admission.     Signed By: Angelia Ware MD     June 1, 2018

## 2018-06-01 NOTE — PERIOP NOTES
Pt keeps trying to clear her throat like there is something in it. Explained to pt and family that it more than likely the interscalene block. Water and ice chips don't help it. Breg sling put put on pt's right arm. Instruction sheet for sling given to .

## 2018-06-01 NOTE — PERIOP NOTES
Dr. King Grewal performed a right ISB with ultrasound. Patient on cardiac monitoring and continuous pulse oximetry, nasal cannula 3 liters throughout the procedure. Patient received 2mg versed for the procedure. Patient tolerated well, VSS. Patient is drowsy but arouses when spoken to. Will continue to monitor.

## 2018-06-01 NOTE — PERIOP NOTES
Patient: Denise Farley MRN: [id number]  SSN: [id number]   YOB: 1945  Age: 74 y.o. Sex: female     Patient is status post Procedure(s):  RIGHT REVERSE SHOULDER ARTHROPLASTY, AXILLARY NERVE NEUROPLASTY, BICEPS TENODESIS. Surgeon(s) and Role: Magdy Mcknight MD - Primary    Local/Dose/Irrigation:  SEE [facility]                  Peripheral IV 06/01/18 Left Antecubital (Active)   Site Assessment Clean, dry, & intact 6/1/2018  9:50 AM   Phlebitis Assessment 0 6/1/2018  9:50 AM   Infiltration Assessment 0 6/1/2018  9:50 AM   Dressing Status Clean, dry, & intact 6/1/2018  9:50 AM   Dressing Type Transparent 6/1/2018  9:50 AM   Hub Color/Line Status Green; Infusing 6/1/2018  9:50 AM            Airway - Endotracheal Tube 06/01/18 Oral (Active)                   Dressing/Packing:  Wound Shoulder Right-DRESSING TYPE: 4 x 4;ABD pad;Petroleum gauze;Special tape (comment); Other (Comment) (coban) (06/01/18 6708)

## 2018-06-01 NOTE — ANESTHESIA PROCEDURE NOTES
Peripheral Block    Start time: 6/1/2018 10:39 AM  End time: 6/1/2018 10:51 AM  Performed by: Solomon Carrero  Authorized by: Solomon Carrero       Pre-procedure: Indications: at surgeon's request and post-op pain management    Preanesthetic Checklist: patient identified, risks and benefits discussed, site marked, timeout performed, anesthesia consent given and patient being monitored    Timeout Time: 10:41          Block Type:   Block Type:   Interscalene  Laterality:  Right  Monitoring:  Standard ASA monitoring, responsive to questions and oxygen  Injection Technique:  Single shot  Procedures: ultrasound guided    Patient Position: supine  Prep: chlorhexidine    Location:  Interscalene  Needle Type:  Stimuplex  Needle Gauge:  22 G  Needle Localization:  Ultrasound guidance  Medication Injected:  0.5%  ropivacaine  Volume (mL):  30    Assessment:  Number of attempts:  1  Injection Assessment:  Incremental injection every 5 mL, no paresthesia, ultrasound image on chart, local visualized surrounding nerve on ultrasound, negative aspiration for blood and no intravascular symptoms  Patient tolerance:  Patient tolerated the procedure well with no immediate complications

## 2018-06-01 NOTE — PERIOP NOTES
Discharge instructions and prescriptions for oxycodone and zofran given to . Pt tolerating ice chips, resting comfortably.

## 2018-06-01 NOTE — DISCHARGE SUMMARY
Ortho Discharge Summary    Patient ID:  Jatin Payer  [id number]  female  74 y.o.  1945    Admit date: 6/1/2018    Discharge date: 6/1/2018    Date of Surgery:   6/1/2018     Preoperative Diagnosis:  OSTEOARTHRITIS RIGHT SHOULDER    Postoperative Diagnosis:   OSTEOARTHRITIS RIGHT SHOULDER    Procedure(s):  RIGHT REVERSE SHOULDER ARTHROPLASTY, AXILLARY NERVE NEUROPLASTY, BICEPS TENODESIS     Anesthesia Type:   General     Surgeon: Sybil Mixon MD                            HPI:  Pt is a 74 y.o. female who has a history of OSTEOARTHRITIS RIGHT SHOULDER with pain and limitations of activities of daily living who presents at this time for a RIGHT REVERSE SHOULDER ARTHROPLASTY, AXILLARY NERVE NEUROPLASTY, BICEPS TENODESIS following the failure of conservative management. PMH:   Past Medical History:   Diagnosis Date    Arthritis     Diabetes ([address]     Gall stones     h/o    GERD (gastroesophageal reflux disease)     High cholesterol     Hypertension     Insomnia     Migraines     Nausea & vomiting     Psychiatric disorder     depression       Medications upon admission :   Prior to Admission Medications   Prescriptions Last Dose Informant Patient Reported? Taking? HYDROcodone-acetaminophen (NORCO) 7.5-325 mg per tablet Not Taking at Unknown time  Yes No   Sig: Take 1 Tab by mouth every six (6) hours as needed for Pain. lisinopril-hydroCHLOROthiazide (PRINZIDE, ZESTORETIC) 10-12.5 mg per tablet 6/1/2018 at am  Yes Yes   Sig: Take 1 Tab by mouth daily. meloxicam (MOBIC) 15 mg tablet 6/1/2018 at am  Yes Yes   Sig: Take 15 mg by mouth daily. metFORMIN (GLUCOPHAGE) 500 mg tablet 5/31/2018 at pm  Yes Yes   Sig: Take 500 mg by mouth daily (with breakfast). omeprazole (PRILOSEC) 40 mg capsule 6/1/2018 at am  Yes Yes   Sig: Take 40 mg by mouth as needed. sertraline (ZOLOFT) 100 mg tablet 6/1/2018 at am  Yes Yes   Sig: Take 50 mg by mouth two (2) times a day.    simvastatin (ZOCOR) 20 mg tablet 5/31/2018 at pm  Yes Yes   Sig: Take 20 mg by mouth nightly. traMADol (ULTRAM) 50 mg tablet 6/1/2018 at am  Yes Yes   Sig: Take 50 mg by mouth two (2) times a day. Facility-Administered Medications: None        Allergies:  No Known Allergies     Hospital Course: The patient underwent surgery. Complications:  None; patient tolerated the procedure well. Was taken to the PACU in stable condition and then transferred to the ortho floor. Perioperative Antibiotics:  Ancef     Postoperative Pain Management:  Oxycodone po     DVT Prophylaxis: SCD,     Postoperative transfusions:    Number of units banked PRBCs =   none     Post Op complications: none    Hemoglobin at discharge:    Lab Results   Component Value Date/Time    HGB 11.9 05/22/2018 11:09 AM    INR 1.0 05/22/2018 11:09 AM       Dressing was changed on POD # 1. Incision - clean, dry and intact. No significant erythema or swelling. Neurovascular exam found to be within normal limits. Wound appears to be healing without any evidence of infection. Physical Therapy started on the day following surgery and participated in bed mobility, transfers and ambulation. Home exercises explained and demonstrated. Will begin formal PT after post op office visit. Gait:                      Discharged to: Home. Condition on Discharge:   good    Discharge instructions:  - Take pain medications as prescribed  - Resume pre hospital diet      - Discharge activity: activity as tolerated, perform home exercises as instructed  - Wound Care Keep wound clean and dry. See discharge instruction sheet.  - Staples or Sutures to be removed around 7-10 days after surgery at post op office visit            -DISCHARGE MEDICATION LIST     Current Discharge Medication List       per medical continuation form      -Follow up in office as scheduled, appointment card given. Signed:   Sarah Anaya [place]    6/1/2018  3:39 PM

## 2018-06-01 NOTE — PERIOP NOTES
TRANSFER - OUT REPORT:    Verbal report given to Ulysses Jain on Glenny Able  being transferred to [phone] for routine post - op       Report consisted of patients Situation, Background, Assessment and   Recommendations(SBAR). Information from the following report(s) OR Summary, Procedure Summary, Intake/Output, MAR, Recent Results and Med Rec Status was reviewed with the receiving nurse. Opportunity for questions and clarification was provided.       Patient transported with:   O2 @ 2 liters  Registered Nurse  [facility]

## 2018-06-01 NOTE — PERIOP NOTES
1312-AT COMPLETION OF CASE, (AFTER DRAPES REMOVED), STAFF NOTICED A TICK ON THE BACK OF THE PATIENT'S RIGHT EAR. TICK REMOVED BY STAFF, EAR CLEANED WITH ALCOHOL WIPE. MD NOTIFIED.

## 2018-06-01 NOTE — ANESTHESIA PREPROCEDURE EVALUATION
Anesthetic History   No history of anesthetic complications            Review of Systems / Medical History  Patient summary reviewed, nursing notes reviewed and pertinent labs reviewed    Pulmonary  Within defined limits                 Neuro/Psych         Headaches (migraines) and psychiatric history (depression)     Cardiovascular    Hypertension              Exercise tolerance: >4 METS     GI/Hepatic/Renal     GERD: well controlled           Endo/Other    Diabetes: type 2    Arthritis     Other Findings            Physical Exam    Airway  Mallampati: I  TM Distance: 4 - 6 cm  Neck ROM: normal range of motion   Mouth opening: Normal     Cardiovascular    Rhythm: regular  Rate: normal      Pertinent negatives: No murmur   Dental    Dentition: Full lower dentures     Pulmonary  Breath sounds clear to auscultation               Abdominal  GI exam deferred       Other Findings            Anesthetic Plan    ASA: 2  Anesthesia type: general and regional - interscalene block    Monitoring Plan: BIS  Post-op pain plan if not by surgeon: peripheral nerve block single    Induction: Intravenous  Anesthetic plan and risks discussed with: Patient      preop glucose 113. 23 hr admission.

## 2018-06-01 NOTE — PERIOP NOTES
Permission received to review discharge instructions and discuss private health information with Allegra Orozco ().

## 2018-06-01 NOTE — IP AVS SNAPSHOT
[address] [facility] 
[phone] Patient: Tina Rome MRN: [id number] LVV:[id number]/[id number] About your hospitalization You were admitted on:  June 1, 2018 You last received care in the:  [facility] OR[facility]EDICS You were discharged on:  June 2, 2018 Why you were hospitalized Your primary diagnosis was:  Not on File Your diagnoses also included:  Rotator Cuff Arthropathy, Right, Osteoarthritis Of Right Shoulder Follow-up Information Follow up With Details Comments Contact Info Andre Hutchinson MD   [address] [facility] [address] [place] [address] 
[phone] Discharge Orders None A check baudilio indicates which time of day the medication should be taken. My Medications CONTINUE taking these medications Instructions Each Dose to Equal  
 Morning Noon Evening Bedtime HYDROcodone-acetaminophen 7.5-325 mg per tablet Commonly known as:  Nvaneet Danielle Your last dose was: Your next dose is: Take 1 Tab by mouth every six (6) hours as needed for Pain. 1 Tab  
    
   
   
   
  
 lisinopril-hydroCHLOROthiazide 10-12.5 mg per tablet Commonly known as:  Autumn Hanley Your last dose was: Your next dose is: Take 1 Tab by mouth daily. 1 Tab  
    
   
   
   
  
 meloxicam 15 mg tablet Commonly known as:  MOBIC Your last dose was: Your next dose is: Take 15 mg by mouth daily. 15 mg  
    
   
   
   
  
 metFORMIN 500 mg tablet Commonly known as:  GLUCOPHAGE Your last dose was: Your next dose is: Take 500 mg by mouth daily (with breakfast). 500 mg  
    
   
   
   
  
 omeprazole 40 mg capsule Commonly known as:  PRILOSEC Your last dose was: Your next dose is: Take 40 mg by mouth as needed.   
 40 mg  
    
   
   
   
  
 sertraline 100 mg tablet Commonly known as:  ZOLOFT Your last dose was: Your next dose is: Take 50 mg by mouth two (2) times a day. 50 mg  
    
   
   
   
  
 simvastatin 20 mg tablet Commonly known as:  ZOCOR Your last dose was: Your next dose is: Take 20 mg by mouth nightly. 20 mg  
    
   
   
   
  
  
STOP taking these medications   
 traMADol 50 mg tablet Commonly known as:  ULTRAM  
   
  
  
  
  
Opioid Education Prescription Opioids: What You Need to Know: 
 
Prescription opioids can be used to help relieve moderate-to-severe pain and are often prescribed following a surgery or injury, or for certain health conditions. These medications can be an important part of treatment but also come with serious risks. Opioids are strong pain medicines. Examples include hydrocodone, oxycodone, fentanyl, and morphine. Heroin is an example of an illegal opioid. It is important to work with your health care provider to make sure you are getting the safest, most effective care. WHAT ARE THE RISKS AND SIDE EFFECTS OF OPIOID USE? Prescription opioids carry serious risks of addiction and overdose, especially with prolonged use. An opioid overdose, often marked by slow breathing, can cause sudden death. The use of prescription opioids can have a number of side effects as well, even when taken as directed. · Tolerance-meaning you might need to take more of a medication for the same pain relief · Physical dependence-meaning you have symptoms of withdrawal when the medication is stopped. Withdrawal symptoms can include nausea, sweating, chills, diarrhea, stomach cramps, and muscle aches. Withdrawal can last up to several weeks, depending on which drug you took and how long you took it. · Increased sensitivity to pain · Constipation · Nausea, vomiting, and dry mouth · Sleepiness and dizziness · Confusion · Depression · Low levels of testosterone that can result in lower sex drive, energy, and strength · Itching and sweating RISKS ARE GREATER WITH:      
· History of drug misuse, substance use disorder, or overdose · Mental health conditions (such as depression or anxiety) · Sleep apnea · Older age (72 years or older) · Pregnancy Avoid alcohol while taking prescription opioids. Also, unless specifically advised by your health care provider, medications to avoid include: · Benzodiazepines (such as Xanax or Valium) · Muscle relaxants (such as Soma or Flexeril) · Hypnotics (such as Ambien or Lunesta) · Other prescription opioids KNOW YOUR OPTIONS Talk to your health care provider about ways to manage your pain that don't involve prescription opioids. Some of these options may actually work better and have fewer risks and side effects. Options may include: 
· Pain relievers such as acetaminophen, ibuprofen, and naproxen · Some medications that are also used for depression or seizures · Physical therapy and exercise · Counseling to help patients learn how to cope better with triggers of pain and stress. · Application of heat or cold compress · Massage therapy · Relaxation techniques Be Informed Make sure you know the name of your medication, how much and how often to take it, and its potential risks & side effects. IF YOU ARE PRESCRIBED OPIOIDS FOR PAIN: 
· Never take opioids in greater amounts or more often than prescribed. Remember the goal is not to be pain-free but to manage your pain at a tolerable level. · Follow up with your primary care provider to: · Work together to create a plan on how to manage your pain. · Talk about ways to help manage your pain that don't involve prescription opioids. · Talk about any and all concerns and side effects. · Help prevent misuse and abuse. · Never sell or share prescription opioids · Help prevent misuse and abuse.  
· Store prescription opioids in a secure place and out of reach of others (this may include visitors, children, friends, and family). · Safely dispose of unused/unwanted prescription opioids: Find your community drug take-back program or your pharmacy mail-back program, or flush them down the toilet, following guidance from the Food and Drug Administration (www.fda.gov/Drugs/ResourcesForYou). · Visit www.cdc.gov/drugoverdose to learn about the risks of opioid abuse and overdose. · If you believe you may be struggling with addiction, tell your health care provider and ask for guidance or call [employer] at 7-679-900-ITDI. Discharge Instructions Arnaud Sanchez M.D. 
         Knee & Shoulder Surgery Sports Medicine [facility] Surgery: Total Shoulder Replacement Surgeon: Justin Quinn MD 
Surgery Date:  6/1/2018 Going Home from the [place] can let your arm hang loosely by your side, but avoid actively lifting the surgical arm. You may perform Codman dangles and shoulder blade pinching exercises as instructed. Do not put weight on the affected arm. Do not lean on it, and do not hold objects with that hand. In general for the first week keep arm in sling, rest, perform simple stretching exercises and ice your shoulder. Common Post-op Concerns Hand swelling: Adjust sling, squeeze a stress ball, do biceps curls to help pump  the fluid out of your arm. Call the office if severe and we can see you to wrap  your hand/arm and send you for hand therapy. Bruising: Very common and will subside over 2-3 weeks. Nausea: If you have severe nausea call the office and a medicine can be called  in. Often times reducing pain medicine doses may provide relief. Fever: Somewhat common the first 3 days after surgery, take Tylenol Sling You should wear the sling most of the time until further instructions at your follow up appointment. You may need to adjust the front shoulder strap so that your hand is slightly above your elbow, this will prevent some of the hand swelling that is common after surgery. You may remove the sling when sitting down and allow your arm to rest in your lap. You may take the sling off to shower. To relieve pain:  Use ice/gel packs.  Put the ice pack directly over the wound, or anywhere you are hurting or swollen.  To control pain and swelling, keep ice on regularly, especially after physical activity.  The packs should stay cold for 3-4 hours. When it is not cold anymore, rotate with the packs in the freezer.   
 Rest for at least 20 minutes between activity or exercises.  You will be sent home with 2 different pain medicines. We recommend using hydromorphone first.  If this medicine makes you feel nauseous or does not adequately control her pain then stopped using this medicine, throw away, and use oxycodone.  To keep track of your pain medications, write down what you take and when you take it.  The last dose of pain medication you got in the hospital was:    
 
Medication Dose Date & Time  Choose your medications based on the pain scale below:  To keep your pain under control, take Tylenol every 6 hours for 14 days - even if you feel like you dont need it.  For mild to moderate pain (1-6 on pain scale), take one pain pill every 3-4 hours as needed.  For severe pain (7-10 on pain scale), take two pain pills every 3-4 hours as needed.  To prevent nausea, take your pain medications with food. Pain Scale  As your pain lessens:  Slowly start taking less pain medication. You may do this by waiting longer between doses or by taking smaller doses.  Stop using the pain medications as soon as you no longer need it, usually in 2-3 weeks.  
 
 
 Generally after shoulder surgery, ambulation or walking around and being active is the best prevention for blood clots.  If you are at risk for blood clots due to your inability to walk around or have had blood clots in the past you may take Aspirin 325 mg once a day for 2-3 weeks to lower the chance of developing a blood clot.  To prevent stomach upset or bleeding:  Do not take non-steroidal anti-inflammatory medications (Ibuprofen, Advil, Motrin, Naproxen, etc.)  Take Pepcid 20 mg twice a day, or a similar home medication, while you are taking a blood thinner. OPSITE (Honeycomb dressing)  Keep your clear, waterproof dressing in place for 5-7 days after your leave the hospital. 
 
 If you are still having drainage, you will need to change your dressing in 5-7 days. You will be given one extra dressing to use at home.  If there is no more drainage from the wound, you may leave it open to the air.    Your staples will be removed at your 1st postop appointment usually about 10 days after surgery. OPSITE DRESSING INSTRUCTIONS  To increase and promote healing:  Stop Smoking (or at least cut back on 
     Smoking).  Eat a well-balanced diet (high in protein 
     and vitamin C).  If you have a poor appetite, drink Ensure, Glucerna, or  
      [place] Instant Breakfast for the next 30 days.  If you are diabetic, you should control your blood  
      sugars to prevent infection and help your wound  
      to heal. 
               
 
 
 
 To prevent constipation, stay active and drink plenty of fluid.  While using pain medications, you should also take stool softeners and laxatives, such as Pericolace and Miralax.  If you are having too many bowel Movements, then you may need 
     to stop taking the laxatives.  
 
 You should have a bowel movement 3-4 days  
     after surgery and then at least every other day while 
     on pain medication.  Take short walks (in your home)  
      about every 1-2 hours during the day.  Try to increase how far you walk each day.  NO DRIVING until your surgeon tells you it is ok. Rehabilitation Healing is the main focus during the early phase of your rehabilitation. This means protecting the shoulder and only performing elbow/hand/wrist exercises to prevent stiffness. · Weeks 0-3:  You should begin dangle exercises the day after surgery. Work at this for 5 repetitions about 5 times a day. You may also begin active-assisted forward flexion at this time by starting to lift your operative arm with your good arm there to help push it up. At this time avoid moving your arm out to the side or externally rotating. · Weeks 3-6: You may begin active forward flexion at this time and continue using your other arm to help raise the operative arm. You may be set up to see a physical therapist to work on your range of motion at this time. · Weeks 6-12: Focus is on actively lifting your arm and achieving full range of motion and beginning with some light weight lifting. You may now begin externally rotating your arm and gently strengthening. At 2 months weight bearing on the arm is now permitted for the use of walkers, pushing up out of a chair, etc.  
 
 
 Please call your physician immediately if you have: 
 
 Constant bleeding from your wound.  Increasing redness or swelling around your wound (Some warmth, bruising and swelling is normal).  Change in wound drainage (increase in amount, color, or bad smell).  Change in mental status (unusual behavior  Temperature over 101.5 degrees Fahrenheit  Pain or redness in the calf (back of your lower leg  see picture)  Increased swelling of the thigh, ankle, calf, or foot.  Emergency: CALL 911 if you have:  Shortness of breath  Chest pain when you cough or taking a deep breath A follow up appointment card will be given to you or your family member after the surgery. If you are not aware of your follow up time or lost the card, please call the office at [phone].  If you have questions or concerns during normal business hours, you may reach Dr. Sabrina Boyle team at [phone]. If you have immediate concerns or questions you may call the office and reach Dr Sabrina Boyle or another [address] [place] [address] provider who is on call. [phone] Tiinkk Announcement We are excited to announce that we are making your provider's discharge notes available to you in Tiinkk. You will see these notes when they are completed and signed by the physician that discharged you from your recent hospital stay. If you have any questions or concerns about any information you see in Tiinkk, please call the Health Information Department where you were seen or reach out to your Primary Care Provider for more information about your plan of care. Introducing [facility]! [facility] introduces Tiinkk patient portal. Now you can access parts of your medical record, email your doctor's office, and request medication refills online. 1. In your internet browser, go to [email or web address] 2. Click on the First Time User? Click Here link in the Sign In box. You will see the New Member Sign Up page. 3. Enter your Tiinkk Access Code exactly as it appears below. You will not need to use this code after youve completed the sign-up process. If you do not sign up before the expiration date, you must request a new code. · Tiinkk Access Code: [email or web address] Expires: 8/12/2018  2:21 PM 
 
4. Enter the last four digits of your Social Security Number (xxxx) and Date of Birth (mm/dd/yyyy) as indicated and click Submit. You will be taken to the next sign-up page. 5. Create a Tiinkk ID.  This will be your Tiinkk login ID and cannot be changed, so think of one that is secure and easy to remember. 6. Create a [employer] password. You can change your password at any time. 7. Enter your Password Reset Question and Answer. This can be used at a later time if you forget your password. 8. Enter your e-mail address. You will receive e-mail notification when new information is available in [address] 9. Click Sign Up. You can now view and download portions of your medical record. 10. Click the Download Summary menu link to download a portable copy of your medical information. If you have questions, please visit the Frequently Asked Questions section of the [employer] website. Remember, [employer] is NOT to be used for urgent needs. For medical emergencies, dial 911. Now available from your iPhone and Android! Introducing Earl King As a [facility]ly [facility] Happ[facility] patient, I wanted to make you aware of our electronic visit tool called Earl Fernando. Lesly Bills [employer] allows you to connect within minutes with a medical provider 24 hours a day, seven days a week via a mobile device or tablet or logging into a secure website from your computer. You can access Earl Donnafin from anywhere in the [place]. A virtual visit might be right for you when you have a simple condition and feel like you just dont want to get out of bed, or cant get away from work for an appointment, when your regular Lesly Saenzs provider is not available (evenings, weekends or holidays), or when youre out of town and need minor care. Electronic visits cost only $49 and if the [employer] provider determines a prescription is needed to treat your condition, one can be electronically transmitted to a nearby pharmacy*. Please take a moment to enroll today if you have not already done so. The enrollment process is free and takes just a few minutes.   To enroll, please download the [employer] renea to your tablet or phone, or visit [email or web address] to enroll on your computer. And, as an [address] [place] [address] patient with a [employer] account, the results of your visits will be scanned into your electronic medical record and your primary care provider will be able to view the scanned results. We urge you to continue to see your regular [facility] provider for your ongoing medical care. And while your primary care provider may not be the one available when you seek a [facility] virtual visit, the peace of mind you get from getting a real diagnosis real time can be priceless. For more information on [employer], view our Frequently Asked Questions (FAQs) at [email or web address]. Sincerely, 
 
Dianah Sicard, MD 
Chief Medical Officer [facility] Tara Anne *:  certain medications cannot be prescribed via Seeking [facility] Providers Seen During Your Hospitalization Provider Specialty Primary office phone Abdiel Muller MD Orthopedic Surgery [phone] Your Primary Care Physician (PCP) Primary Care Physician Office Phone Office Fax Cheryle Schooner [phone] You are allergic to the following No active allergies Recent Documentation Height Weight BMI OB Status Smoking Status 1.549 m 70.3 kg 29.29 kg/m2 Hysterectomy Never Smoker Emergency Contacts Name Discharge Info Relation Home Work Mobile Keon Navarro DISCHARGE CAREGIVER [3] Spouse [3] [phone] Patient Belongings The following personal items are in your possession at time of discharge: 
  Dental Appliances: Lowers  Visual Aid: At bedside      Home Medications: None   Jewelry: None  Clothing: At bedside    Other Valuables: None Please provide this summary of care documentation to your next provider. Signatures-by signing, you are acknowledging that this After Visit Summary has been reviewed with you and you have received a copy. Patient Signature:  ____________________________________________________________ Date:  ____________________________________________________________  
  
Rosa Elena Mccormick Provider Signature:  ____________________________________________________________ Date:  ____________________________________________________________

## 2018-06-01 NOTE — PERIOP NOTES
Pt taken to room 3218. Transferred to bed by slide board. Dania Ruiz RN in the room with no questions.

## 2018-06-01 NOTE — DISCHARGE INSTRUCTIONS
Arnaud Corona M.D.           Willadean Hatchet  Surgery: Total Shoulder Replacement  Surgeon: Zan Morin MD  Surgery Date:  6/1/2018      Going Home from the [address] [place] [address] can let your arm hang loosely by your side, but avoid actively lifting the surgical arm. You may perform Codman dangles and shoulder blade pinching exercises as instructed. Do not put weight on the affected arm. Do not lean on it, and do not hold objects with that hand. In general for the first week keep arm in sling, rest, perform simple stretching exercises and ice your shoulder. Common Post-op Concerns      Hand swelling: Adjust sling, squeeze a stress ball, do biceps curls to help pump  the fluid out of your arm. Call the office if severe and we can see you to wrap  your hand/arm and send you for hand therapy. Bruising: Very common and will subside over 2-3 weeks. Nausea: If you have severe nausea call the office and a medicine can be called  in. Often times reducing pain medicine doses may provide relief. Fever: Somewhat common the first 3 days after surgery, take Tylenol    Sling  You should wear the sling most of the time until further instructions at your follow up appointment. You may need to adjust the front shoulder strap so that your hand is slightly above your elbow, this will prevent some of the hand swelling that is common after surgery. You may remove the sling when sitting down and allow your arm to rest in your lap. You may take the sling off to shower. To relieve pain:   Use ice/gel packs.  Put the ice pack directly over the wound, or anywhere you are hurting or swollen.  To control pain and swelling, keep ice on regularly, especially after physical activity.  The packs should stay cold for 3-4 hours. When it is not cold anymore, rotate with the packs in the freezer.        Rest for at least 20 minutes between activity or exercises.  You will be sent home with 2 different pain medicines. We recommend using hydromorphone first.  If this medicine makes you feel nauseous or does not adequately control her pain then stopped using this medicine, throw away, and use oxycodone.  To keep track of your pain medications, write down what you take and when you take it.  The last dose of pain medication you got in the hospital was:       Medication    Dose    Date & Time             Choose your medications based on the pain scale below:     To keep your pain under control, take Tylenol every 6 hours for 14 days - even if you feel like you dont need it.  For mild to moderate pain (1-6 on pain scale), take one pain pill every 3-4 hours as needed.  For severe pain (7-10 on pain scale), take two pain pills every 3-4 hours as needed.  To prevent nausea, take your pain medications with food. Pain Scale                   As your pain lessens:     Slowly start taking less pain medication. You may do this by waiting longer between doses or by taking smaller doses.  Stop using the pain medications as soon as you no longer need it, usually in 2-3 weeks.  Generally after shoulder surgery, ambulation or walking around and being active is the best prevention for blood clots.  If you are at risk for blood clots due to your inability to walk around or have had blood clots in the past you may take Aspirin 325 mg once a day for 2-3 weeks to lower the chance of developing a blood clot.  To prevent stomach upset or bleeding:   Do not take non-steroidal anti-inflammatory medications (Ibuprofen, Advil, Motrin, Naproxen, etc.)    Take Pepcid 20 mg twice a day, or a similar home medication, while you are taking a blood thinner.             OPSITE (Honeycomb dressing)     Keep your clear, waterproof dressing in place for 5-7 days after your leave the hospital.     If you are still having drainage, you will need to change your dressing in 5-7 days. You will be given one extra dressing to use at home.  If there is no more drainage from the wound, you may leave it open to the air.    Your staples will be removed at your 1st postop appointment usually about 10 days after surgery. OPSITE DRESSING INSTRUCTIONS                       To increase and promote healing:   Stop Smoking (or at least cut back on       Smoking).  Eat a well-balanced diet (high in protein       and vitamin C).  If you have a poor appetite, drink Ensure, Glucerna, or         Basin Instant Breakfast for the next 30 days.  If you are diabetic, you should control your blood         sugars to prevent infection and help your wound         to heal.                         To prevent constipation, stay active and drink plenty of fluid.  While using pain medications, you should also take stool softeners and laxatives, such as Pericolace and Miralax.  If you are having too many bowel       Movements, then you may need       to stop taking the laxatives.  You should have a bowel movement 3-4 days        after surgery and then at least every other day while       on pain medication.  Take short walks (in your home)         about every 1-2 hours during the day.  Try to increase how far you walk each day.  NO DRIVING until your surgeon tells you it is ok. Rehabilitation  Healing is the main focus during the early phase of your rehabilitation. This means protecting the shoulder and only performing elbow/hand/wrist exercises to prevent stiffness. · Weeks 0-3:  You should begin dangle exercises the day after surgery. Work at this for 5 repetitions about 5 times a day.  You may also begin active-assisted forward flexion at this time by starting to lift your operative arm with your good arm there to help push it up. At this time avoid moving your arm out to the side or externally rotating. · Weeks 3-6: You may begin active forward flexion at this time and continue using your other arm to help raise the operative arm. You may be set up to see a physical therapist to work on your range of motion at this time. · Weeks 6-12: Focus is on actively lifting your arm and achieving full range of motion and beginning with some light weight lifting. You may now begin externally rotating your arm and gently strengthening. At 2 months weight bearing on the arm is now permitted for the use of walkers, pushing up out of a chair, etc.        Please call your physician immediately if you have:     Constant bleeding from your wound.  Increasing redness or swelling around your wound (Some warmth, bruising and swelling is normal).  Change in wound drainage (increase in amount, color, or bad smell).  Change in mental status (unusual behavior   Temperature over 101.5 degrees Fahrenheit      Pain or redness in the calf (back of your lower leg - see picture)     Increased swelling of the thigh, ankle, calf, or foot.  Emergency: CALL [phone] if you have:     Shortness of breath     Chest pain when you cough or taking a deep breath          A follow up appointment card will be given to you or your family member after the surgery. If you are not aware of your follow up time or lost the card, please call the office at [phone].  If you have questions or concerns during normal business hours, you may reach Dr. Graeme Caceres team at [phone]. If you have immediate concerns or questions you may call the office and reach Dr Graeme Caceres or another Ke Garcia provider who is on call.  [phone]

## 2018-06-01 NOTE — PERIOP NOTES
Called ortho charge and spoke with Sally ABDI. Asia Frazier RN, who is taking [phone], is with a pt. She will have her call me.

## 2018-06-02 VITALS
WEIGHT: 155 LBS | RESPIRATION RATE: 18 BRPM | BODY MASS INDEX: 29.27 KG/M2 | HEART RATE: 78 BPM | DIASTOLIC BLOOD PRESSURE: 72 MMHG | TEMPERATURE: 98.2 F | OXYGEN SATURATION: 95 % | SYSTOLIC BLOOD PRESSURE: 121 MMHG | HEIGHT: 61 IN

## 2018-06-02 LAB
GLUCOSE BLD STRIP.AUTO-MCNC: 120 MG/DL (ref 65–100)
GLUCOSE BLD STRIP.AUTO-MCNC: 176 MG/DL (ref 65–100)
SERVICE CMNT-IMP: ABNORMAL
SERVICE CMNT-IMP: ABNORMAL

## 2018-06-02 PROCEDURE — G8987 SELF CARE CURRENT STATUS: HCPCS

## 2018-06-02 PROCEDURE — 74011250636 HC RX REV CODE- 250/636: Performed by: PHYSICIAN ASSISTANT

## 2018-06-02 PROCEDURE — G8989 SELF CARE D/C STATUS: HCPCS

## 2018-06-02 PROCEDURE — 97110 THERAPEUTIC EXERCISES: CPT

## 2018-06-02 PROCEDURE — 97165 OT EVAL LOW COMPLEX 30 MIN: CPT

## 2018-06-02 PROCEDURE — G8988 SELF CARE GOAL STATUS: HCPCS

## 2018-06-02 PROCEDURE — 74011250637 HC RX REV CODE- 250/637: Performed by: PHYSICIAN ASSISTANT

## 2018-06-02 PROCEDURE — 82962 GLUCOSE BLOOD TEST: CPT

## 2018-06-02 PROCEDURE — 97535 SELF CARE MNGMENT TRAINING: CPT

## 2018-06-02 RX ADMIN — SERTRALINE HYDROCHLORIDE 50 MG: 50 TABLET ORAL at 08:47

## 2018-06-02 RX ADMIN — POLYETHYLENE GLYCOL 3350 17 G: 17 POWDER, FOR SOLUTION ORAL at 08:44

## 2018-06-02 RX ADMIN — ACETAMINOPHEN 500 MG: 500 TABLET, FILM COATED ORAL at 11:45

## 2018-06-02 RX ADMIN — LISINOPRIL: 5 TABLET ORAL at 09:00

## 2018-06-02 RX ADMIN — OXYCODONE HYDROCHLORIDE 10 MG: 5 TABLET ORAL at 11:51

## 2018-06-02 RX ADMIN — OXYCODONE HYDROCHLORIDE 10 MG: 5 TABLET ORAL at 00:54

## 2018-06-02 RX ADMIN — OXYCODONE HYDROCHLORIDE 10 MG: 5 TABLET ORAL at 08:45

## 2018-06-02 RX ADMIN — Medication 2 G: at 04:42

## 2018-06-02 RX ADMIN — METFORMIN HYDROCHLORIDE 500 MG: 500 TABLET, FILM COATED ORAL at 08:47

## 2018-06-02 RX ADMIN — OXYCODONE HYDROCHLORIDE 10 MG: 5 TABLET ORAL at 05:56

## 2018-06-02 RX ADMIN — Medication 10 ML: at 07:26

## 2018-06-02 RX ADMIN — ACETAMINOPHEN 500 MG: 500 TABLET, FILM COATED ORAL at 05:49

## 2018-06-02 RX ADMIN — PANTOPRAZOLE SODIUM 40 MG: 40 TABLET, DELAYED RELEASE ORAL at 08:46

## 2018-06-02 RX ADMIN — ACETAMINOPHEN 500 MG: 500 TABLET, FILM COATED ORAL at 00:37

## 2018-06-02 RX ADMIN — STANDARDIZED SENNA CONCENTRATE AND DOCUSATE SODIUM 1 TABLET: 8.6; 5 TABLET, FILM COATED ORAL at 08:44

## 2018-06-02 NOTE — PROGRESS NOTES
Spiritual Care Partner Volunteer visited patient in Ortho on June 2, 2018.   Documented by:  BRENT Yuen, [facility], raven ZAMUDIO [facility] Paging Service  [phone])

## 2018-06-02 NOTE — PROGRESS NOTES
Occupational Therapy EVALUATION/discharge  Patient: Halley Reyes (28 y.o. female)  Date: 6/2/2018  Primary Diagnosis: OSTEOARTHRITIS RIGHT SHOULDER  Osteoarthritis of right shoulder  Procedure(s) (LRB):  RIGHT REVERSE SHOULDER ARTHROPLASTY, AXILLARY NERVE NEUROPLASTY, BICEPS TENODESIS (Right) 1 Day Post-Op   Precautions:   NWB    ASSESSMENT:   Based on the objective data described below, the patient presents with with ROM and strength limitations s/p R reverse shoulder arthroplasty POD 1. Pt received supine with ice pack in place and  at bedside. Reviewed activity and ROM limitations, prescribed exercises, dressing to include sling donning and doffing as well as ADLs. Pt was supervision for all transfers and mobility. BP stable pre (109/74 HR 85) and post activity which included hallway mobility (124/69 HR 89). Pt with intact balance, has baseline B knee OA which limits her mobility at times. Post activity, noted pt slightly PULIDO with SpO2 84% on room air. Pt denying SOB or dizziness. Pt recovered to 95% within 1 minute with cues for PLB without supplemental O2 being placed. Pt provided handouts on prescribed exercises with pt indicating understanding to all education. Pt is needing min to mod A for ADLs at this time as she is R hand dominant but  will be able to assist him as needed. Safe to discharge home today, discussed with Radha Shoemaker. Jaclyn ESTRADA. Further skilled acute occupational therapy is not indicated at this time. Discharge Recommendations: OP PT per MD follow up  Further Equipment Recommendations for Discharge: none      SUBJECTIVE:   Patient stated I am used to doing everything.     OBJECTIVE DATA SUMMARY:   HISTORY:   Past Medical History:   Diagnosis Date    Arthritis     Diabetes ([address]     Gall stones     h/o    GERD (gastroesophageal reflux disease)     High cholesterol     Hypertension     Insomnia     Migraines     Nausea & vomiting     Psychiatric disorder     depression Past Surgical History:   Procedure Laterality Date    HX APPENDECTOMY      HX CHOLECYSTECTOMY      HX HERNIA REPAIR  08/2017, 3/21/18    ventral hernia with mesh    HX HYSTERECTOMY      HX KNEE REPLACEMENT      bilateral    HX OOPHORECTOMY      HX ORTHOPAEDIC      rt ankle    HX ORTHOPAEDIC      joints scrapped bilat. hands due to arthritis    HX UROLOGICAL      rectal and bladder tack       Prior Level of Function/Environment/Context: lives with  of 50+ years, independent. Does not use AD to ambulate. Occupations in which the patient is/was successful, what are the barriers preventing that success:   Performance Patterns (routines, roles, habits, and rituals):   Personal Interests and/or values:   Expanded or extensive additional review of patient history:     Home Situation  Home Environment: Private residence  # Steps to Enter: 3  One/Two Story Residence: Two story  Living Alone: No  Support Systems: Spouse/Significant Other/Partner  Patient Expects to be Discharged to[de-identified] Private residence  Current DME Used/Available at Home: Glucometer    Hand dominance: Right    EXAMINATION OF PERFORMANCE DEFICITS:  Cognitive/Behavioral Status:  Neurologic State: Alert  Orientation Level: Oriented X4  Cognition: Follows commands  Perception: Appears intact  Perseveration: No perseveration noted  Safety/Judgement: Awareness of environment    Skin: R shoulder bandage C/D/I    Edema: R shoulder, ice packs not present in room. Rehab tech to obtain     Hearing:   Auditory  Auditory Impairment: None    Vision/Perceptual:    Tracking: Able to track stimulus in all quadrants w/o difficulty                      Acuity: Within Defined Limits    Corrective Lenses: Glasses    Range of Motion:    AROM: Grossly decreased, non-functional (R shoulder)  PROM: Generally decreased, functional                      Strength:    Strength: Generally decreased, functional                Coordination:  Coordination: Within functional limits  Fine Motor Skills-Upper: Left Intact; Right Intact    Gross Motor Skills-Upper: Left Intact; Right Intact    Tone & Sensation:       Sensation: Intact (slight numbness reported around R pectoral muscle)                      Balance:  Sitting: Intact  Standing: Intact; Without support (slightly decreased higher level balance, knee OA)    Functional Mobility and Transfers for ADLs:  Bed Mobility:  Supine to Sit: Independent  Sit to Supine: Independent  Scooting: Independent (occasional cues for NWB R UE)    Transfers:  Sit to Stand: Supervision  Stand to Sit: Supervision  Bed to Chair: Supervision  Toilet Transfer : Supervision    ADL Assessment:  Feeding: Setup (R hand dominant)    Oral Facial Hygiene/Grooming: Supervision    Bathing: Minimum assistance    Upper Body Dressing: Moderate assistance    Lower Body Dressing: Minimum assistance; Moderate assistance    Toileting: Minimum assistance              ADL Intervention and task modifications:  Educated pt on sling donning and doffing, dressing techniques, and bathing.                    Cognitive Retraining  Safety/Judgement: Awareness of environment; Fall prevention;Home safety;Good awareness of safety precautions     Therapeutic Exercise:  Reviewed pendulums, AAROM/PROM R shoulder flexion exercises with handout provided for reinforcement. Reviewed hand, wrist and elbow exercises to perform. Pt demonstrated above with supervision and occasional cues. Functional Measure:  Barthel Index:    Bathin  Bladder: 10  Bowels: 10  Groomin  Dressin  Feedin  Mobility: 15  Stairs: 10  Toilet Use: 5  Transfer (Bed to Chair and Back): 15  Total: 75       Barthel and G-code impairment scale:  Percentage of impairment CH  0% CI  1-19% CJ  20-39% CK  40-59% CL  60-79% CM  80-99% CN  100%   Barthel Score 0-100 100 99-80 79-60 59-40 20-39 1-19   0   Barthel Score 0-20 20 17-19 13-16 9-12 5-8 1-4 0      The Barthel ADL Index: Guidelines  1.  The index should be used as a record of what a patient does, not as a record of what a patient could do. 2. The main aim is to establish degree of independence from any help, physical or verbal, however minor and for whatever reason. 3. The need for supervision renders the patient not independent. 4. A patient's performance should be established using the best available evidence. Asking the patient, friends/relatives and nurses are the usual sources, but direct observation and common sense are also important. However direct testing is not needed. 5. Usually the patient's performance over the preceding 24-48 hours is important, but occasionally longer periods will be relevant. 6. Middle categories imply that the patient supplies over 50 per cent of the effort. 7. Use of aids to be independent is allowed. Anais Corrigan., Barthel, D.W. ([id number]). Functional evaluation: the Barthel Index. [address] [facility] (14)2. LAURA Mcclain, Joshua Sage., Monique Kan., [place], [address] [place] [address] (1999). Measuring the change indisability after inpatient rehabilitation; comparison of the responsiveness of the Barthel Index and Functional [place] Measure. Journal of Neurology, Neurosurgery, and Psychiatry, 66(4), 368-708. Kranthi Hatfield, N.J.A, JEVON StoutJ.ANDREINA, & Arturo James, M.A. (2004.) Assessment of post-stroke quality of life in cost-effectiveness studies: The usefulness of the Barthel Index and the EuroQoL-5D. Quality of Life Research, 13, 451-31         G codes: In compliance with CMSs Claims Based Outcome Reporting, the following G-code set was chosen for this patient based on their primary functional limitation being treated: The outcome measure chosen to determine the severity of the functional limitation was the barthel index with a score of 75/100 which was correlated with the impairment scale. ?  Self Care:     - CURRENT STATUS: CJ - 20%-39% impaired, limited or restricted    - GOAL STATUS: CJ - 20%-39% impaired, limited or restricted    - D/C STATUS:  CJ - 20%-39% impaired, limited or restricted     Occupational Therapy Evaluation Charge Determination   History Examination Decision-Making   LOW Complexity : Brief history review  LOW Complexity : 1-3 performance deficits relating to physical, cognitive , or psychosocial skils that result in activity limitations and / or participation restrictions  LOW Complexity : No comorbidities that affect functional and no verbal or physical assistance needed to complete eval tasks       Based on the above components, the patient evaluation is determined to be of the following complexity level: LOW   Pain:  Pain Scale 1: Numeric (0 - 10)  Pain Intensity 1: 7  Pain Location 1: Shoulder  Pain Orientation 1: Right  Pain Description 1: Aching  Pain Intervention(s) 1: Medication (see MAR); Cold pack  Activity Tolerance:   Hypoxic with activity to 84%, recovered to 95% within 1 minute with cues for PLB without supplemental O2 being placed. Please refer to the flowsheet for vital signs taken during this treatment. After treatment:   []  Patient left in no apparent distress sitting up in chair  [x]  Patient left in no apparent distress sitting EOB   [x]  Call bell left within reach  [x]  Nursing notified  [x]  Caregiver present  []  Bed alarm activated    COMMUNICATION/EDUCATION:   Communication/Collaboration:  [x]      Home safety education was provided and the patient/caregiver indicated understanding. [x]      Patient/family have participated as able and agree with findings and recommendations. []      Patient is unable to participate in plan of care at this time.   Findings and recommendations were discussed with: Registered Nurse    Walter Lucas OT  Time Calculation: 39 mins

## 2018-06-02 NOTE — PROGRESS NOTES
Pt given education regarding discharge instructions, prescriptions, prescription literature, and extra dressings. Opportunities for questions given. PIV taken out with cath tip intact. Pt discharged home with .

## 2018-06-02 NOTE — PROGRESS NOTES
Bedside and Verbal shift change report given to [facility] (oncoming nurse) by Jania Mathew RN (offgoing nurse). Report included the following information SBAR, Kardex, Intake/Output, MAR, Accordion, Recent Results and Med Rec Status.

## 2018-06-02 NOTE — PROGRESS NOTES
[facility] - Progress Note      Pt resting in bed. No complaints. Pain minimal. Feels ready to go home  VSS Afebrile. Incision and dressing c.d.i  Calves soft and supple; No pain with passive stretch  Sensation and motor intact  SCD/Foot pumps for mechanical DVT proph while in bed     PLAN:  1) OT Today -ambulate hallways, supine PROM and dangles  2) Pain control continue current  3) Plan d/c after PT/OT.     SEAN Stone

## 2018-07-12 ENCOUNTER — HOSPITAL ENCOUNTER (INPATIENT)
Age: 73
LOS: 3 days | Discharge: HOME | DRG: 483 | End: 2018-07-15
Attending: ORTHOPAEDIC SURGERY | Admitting: ORTHOPAEDIC SURGERY
Payer: MEDICARE

## 2018-07-12 ENCOUNTER — ANESTHESIA EVENT (OUTPATIENT)
Dept: SURGERY | Age: 73
DRG: 483 | End: 2018-07-12
Payer: MEDICARE

## 2018-07-12 PROBLEM — Z96.611 STATUS POST REVERSE ARTHROPLASTY OF RIGHT SHOULDER: Status: ACTIVE | Noted: 2018-07-12

## 2018-07-12 PROBLEM — S43.004A SHOULDER DISLOCATION, RIGHT, INITIAL ENCOUNTER: Status: ACTIVE | Noted: 2018-07-12

## 2018-07-12 LAB
ABO + RH BLD: NORMAL
ALBUMIN SERPL-MCNC: 3.7 G/DL (ref 3.5–5)
ALBUMIN/GLOB SERPL: 0.9 {RATIO} (ref 1.1–2.2)
ALP SERPL-CCNC: 111 U/L (ref 45–117)
ALT SERPL-CCNC: 15 U/L (ref 12–78)
ANION GAP SERPL CALC-SCNC: 10 MMOL/L (ref 5–15)
APPEARANCE UR: CLEAR
APTT PPP: 27.4 SEC (ref 22.1–32)
AST SERPL-CCNC: 16 U/L (ref 15–37)
BACTERIA URNS QL MICRO: ABNORMAL /HPF
BASOPHILS # BLD: 0 K/UL (ref 0–0.1)
BASOPHILS NFR BLD: 1 % (ref 0–1)
BILIRUB SERPL-MCNC: 0.3 MG/DL (ref 0.2–1)
BILIRUB UR QL: NEGATIVE
BLOOD GROUP ANTIBODIES SERPL: NORMAL
BUN SERPL-MCNC: 29 MG/DL (ref 6–20)
BUN/CREAT SERPL: 19 (ref 12–20)
CALCIUM SERPL-MCNC: 9.4 MG/DL (ref 8.5–10.1)
CHLORIDE SERPL-SCNC: 105 MMOL/L (ref 97–108)
CO2 SERPL-SCNC: 27 MMOL/L (ref 21–32)
COLOR UR: ABNORMAL
CREAT SERPL-MCNC: 1.54 MG/DL (ref 0.55–1.02)
DIFFERENTIAL METHOD BLD: ABNORMAL
EOSINOPHIL # BLD: 1 K/UL (ref 0–0.4)
EOSINOPHIL NFR BLD: 13 % (ref 0–7)
EPITH CASTS URNS QL MICRO: ABNORMAL /LPF
ERYTHROCYTE [DISTWIDTH] IN BLOOD BY AUTOMATED COUNT: 14.1 % (ref 11.5–14.5)
GLOBULIN SER CALC-MCNC: 3.9 G/DL (ref 2–4)
GLUCOSE BLD STRIP.AUTO-MCNC: 115 MG/DL (ref 65–100)
GLUCOSE BLD STRIP.AUTO-MCNC: 133 MG/DL (ref 65–100)
GLUCOSE BLD STRIP.AUTO-MCNC: 165 MG/DL (ref 65–100)
GLUCOSE SERPL-MCNC: 153 MG/DL (ref 65–100)
GLUCOSE UR STRIP.AUTO-MCNC: NEGATIVE MG/DL
HCT VFR BLD AUTO: 35.7 % (ref 35–47)
HGB BLD-MCNC: 11.1 G/DL (ref 11.5–16)
HGB UR QL STRIP: NEGATIVE
IMM GRANULOCYTES # BLD: 0 K/UL (ref 0–0.04)
IMM GRANULOCYTES NFR BLD AUTO: 0 % (ref 0–0.5)
INR PPP: 1 (ref 0.9–1.1)
KETONES UR QL STRIP.AUTO: NEGATIVE MG/DL
LEUKOCYTE ESTERASE UR QL STRIP.AUTO: ABNORMAL
LYMPHOCYTES # BLD: 1.5 K/UL (ref 0.8–3.5)
LYMPHOCYTES NFR BLD: 21 % (ref 12–49)
MCH RBC QN AUTO: 30.3 PG (ref 26–34)
MCHC RBC AUTO-ENTMCNC: 31.1 G/DL (ref 30–36.5)
MCV RBC AUTO: 97.5 FL (ref 80–99)
MONOCYTES # BLD: 0.5 K/UL (ref 0–1)
MONOCYTES NFR BLD: 7 % (ref 5–13)
NEUTS SEG # BLD: 4.3 K/UL (ref 1.8–8)
NEUTS SEG NFR BLD: 58 % (ref 32–75)
NITRITE UR QL STRIP.AUTO: POSITIVE
NRBC # BLD: 0 K/UL (ref 0–0.01)
NRBC BLD-RTO: 0 PER 100 WBC
PH UR STRIP: 6 [PH] (ref 5–8)
PLATELET # BLD AUTO: 234 K/UL (ref 150–400)
PMV BLD AUTO: 9.5 FL (ref 8.9–12.9)
POTASSIUM SERPL-SCNC: 4.1 MMOL/L (ref 3.5–5.1)
PROT SERPL-MCNC: 7.6 G/DL (ref 6.4–8.2)
PROT UR STRIP-MCNC: NEGATIVE MG/DL
PROTHROMBIN TIME: 10.2 SEC (ref 9–11.1)
RBC # BLD AUTO: 3.66 M/UL (ref 3.8–5.2)
RBC #/AREA URNS HPF: ABNORMAL /HPF (ref 0–5)
SERVICE CMNT-IMP: ABNORMAL
SODIUM SERPL-SCNC: 142 MMOL/L (ref 136–145)
SP GR UR REFRACTOMETRY: 1.02 (ref 1–1.03)
SPECIMEN EXP DATE BLD: NORMAL
THERAPEUTIC RANGE,PTTT: NORMAL SECS (ref 58–77)
UA: UC IF INDICATED,UAUC: ABNORMAL
UROBILINOGEN UR QL STRIP.AUTO: 0.2 EU/DL (ref 0.2–1)
WBC # BLD AUTO: 7.3 K/UL (ref 3.6–11)
WBC URNS QL MICRO: ABNORMAL /HPF (ref 0–4)

## 2018-07-12 PROCEDURE — 81001 URINALYSIS AUTO W/SCOPE: CPT | Performed by: PHYSICIAN ASSISTANT

## 2018-07-12 PROCEDURE — 80053 COMPREHEN METABOLIC PANEL: CPT | Performed by: PHYSICIAN ASSISTANT

## 2018-07-12 PROCEDURE — 74011250636 HC RX REV CODE- 250/636: Performed by: PHYSICIAN ASSISTANT

## 2018-07-12 PROCEDURE — 82962 GLUCOSE BLOOD TEST: CPT

## 2018-07-12 PROCEDURE — 74011000258 HC RX REV CODE- 258: Performed by: PHYSICIAN ASSISTANT

## 2018-07-12 PROCEDURE — 87077 CULTURE AEROBIC IDENTIFY: CPT | Performed by: PHYSICIAN ASSISTANT

## 2018-07-12 PROCEDURE — 74011250637 HC RX REV CODE- 250/637: Performed by: ORTHOPAEDIC SURGERY

## 2018-07-12 PROCEDURE — 74011250637 HC RX REV CODE- 250/637: Performed by: PHYSICIAN ASSISTANT

## 2018-07-12 PROCEDURE — 86900 BLOOD TYPING SEROLOGIC ABO: CPT | Performed by: PHYSICIAN ASSISTANT

## 2018-07-12 PROCEDURE — 87186 SC STD MICRODIL/AGAR DIL: CPT | Performed by: PHYSICIAN ASSISTANT

## 2018-07-12 PROCEDURE — 36415 COLL VENOUS BLD VENIPUNCTURE: CPT | Performed by: PHYSICIAN ASSISTANT

## 2018-07-12 PROCEDURE — 85610 PROTHROMBIN TIME: CPT | Performed by: PHYSICIAN ASSISTANT

## 2018-07-12 PROCEDURE — 87086 URINE CULTURE/COLONY COUNT: CPT | Performed by: PHYSICIAN ASSISTANT

## 2018-07-12 PROCEDURE — 85025 COMPLETE CBC W/AUTO DIFF WBC: CPT | Performed by: PHYSICIAN ASSISTANT

## 2018-07-12 PROCEDURE — 85730 THROMBOPLASTIN TIME PARTIAL: CPT | Performed by: PHYSICIAN ASSISTANT

## 2018-07-12 PROCEDURE — 65270000029 HC RM PRIVATE

## 2018-07-12 RX ORDER — ACETAMINOPHEN 500 MG
1000 TABLET ORAL
Status: DISCONTINUED | OUTPATIENT
Start: 2018-07-12 | End: 2018-07-12

## 2018-07-12 RX ORDER — OXYCODONE HYDROCHLORIDE 5 MG/1
2.5 TABLET ORAL
Status: DISCONTINUED | OUTPATIENT
Start: 2018-07-12 | End: 2018-07-12

## 2018-07-12 RX ORDER — SIMVASTATIN 20 MG/1
20 TABLET, FILM COATED ORAL
Status: DISCONTINUED | OUTPATIENT
Start: 2018-07-12 | End: 2018-07-12

## 2018-07-12 RX ORDER — MELOXICAM 15 MG/1
15 TABLET ORAL DAILY
COMMUNITY
End: 2018-07-15

## 2018-07-12 RX ORDER — DEXTROSE 50 % IN WATER (D50W) INTRAVENOUS SYRINGE
12.5-25 AS NEEDED
Status: DISCONTINUED | OUTPATIENT
Start: 2018-07-12 | End: 2018-07-13

## 2018-07-12 RX ORDER — OMEPRAZOLE 40 MG/1
40 CAPSULE, DELAYED RELEASE ORAL AS NEEDED
Status: DISCONTINUED | OUTPATIENT
Start: 2018-07-12 | End: 2018-07-15 | Stop reason: HOSPADM

## 2018-07-12 RX ORDER — OXYCODONE HYDROCHLORIDE 5 MG/1
5 TABLET ORAL
Status: DISCONTINUED | OUTPATIENT
Start: 2018-07-12 | End: 2018-07-13

## 2018-07-12 RX ORDER — MAGNESIUM SULFATE 100 %
4 CRYSTALS MISCELLANEOUS AS NEEDED
Status: DISCONTINUED | OUTPATIENT
Start: 2018-07-12 | End: 2018-07-13

## 2018-07-12 RX ORDER — TRAMADOL HYDROCHLORIDE 50 MG/1
50 TABLET ORAL DAILY
COMMUNITY
End: 2018-07-15

## 2018-07-12 RX ORDER — SERTRALINE HYDROCHLORIDE 50 MG/1
50 TABLET, FILM COATED ORAL 2 TIMES DAILY
Status: DISCONTINUED | OUTPATIENT
Start: 2018-07-12 | End: 2018-07-15 | Stop reason: HOSPADM

## 2018-07-12 RX ORDER — METFORMIN HYDROCHLORIDE 500 MG/1
500 TABLET ORAL
Status: DISCONTINUED | OUTPATIENT
Start: 2018-07-13 | End: 2018-07-12

## 2018-07-12 RX ORDER — INSULIN LISPRO 100 [IU]/ML
INJECTION, SOLUTION INTRAVENOUS; SUBCUTANEOUS
Status: DISCONTINUED | OUTPATIENT
Start: 2018-07-12 | End: 2018-07-13

## 2018-07-12 RX ORDER — ACETAMINOPHEN 500 MG
500 TABLET ORAL
Status: DISCONTINUED | OUTPATIENT
Start: 2018-07-12 | End: 2018-07-13

## 2018-07-12 RX ORDER — HYDROCODONE BITARTRATE AND ACETAMINOPHEN 7.5; 325 MG/1; MG/1
1 TABLET ORAL
Status: DISCONTINUED | OUTPATIENT
Start: 2018-07-12 | End: 2018-07-15 | Stop reason: HOSPADM

## 2018-07-12 RX ORDER — HYDROCODONE BITARTRATE AND ACETAMINOPHEN 7.5; 325 MG/1; MG/1
1 TABLET ORAL 2 TIMES DAILY
COMMUNITY
End: 2018-07-15

## 2018-07-12 RX ORDER — LISINOPRIL AND HYDROCHLOROTHIAZIDE 10; 12.5 MG/1; MG/1
1 TABLET ORAL DAILY
Status: DISCONTINUED | OUTPATIENT
Start: 2018-07-13 | End: 2018-07-12

## 2018-07-12 RX ORDER — SODIUM CHLORIDE 9 MG/ML
50 INJECTION, SOLUTION INTRAVENOUS CONTINUOUS
Status: DISCONTINUED | OUTPATIENT
Start: 2018-07-12 | End: 2018-07-13

## 2018-07-12 RX ORDER — SIMVASTATIN 20 MG/1
20 TABLET, FILM COATED ORAL
Status: DISCONTINUED | OUTPATIENT
Start: 2018-07-12 | End: 2018-07-13

## 2018-07-12 RX ADMIN — HYDROCODONE BITARTRATE AND ACETAMINOPHEN 1 TABLET: 7.5; 325 TABLET ORAL at 14:13

## 2018-07-12 RX ADMIN — HYDROCODONE BITARTRATE AND ACETAMINOPHEN 1 TABLET: 7.5; 325 TABLET ORAL at 22:10

## 2018-07-12 RX ADMIN — OXYCODONE HYDROCHLORIDE 5 MG: 5 TABLET ORAL at 16:17

## 2018-07-12 RX ADMIN — SIMVASTATIN 20 MG: 20 TABLET, FILM COATED ORAL at 22:07

## 2018-07-12 RX ADMIN — ACETAMINOPHEN 500 MG: 500 TABLET, FILM COATED ORAL at 23:59

## 2018-07-12 RX ADMIN — SERTRALINE HYDROCHLORIDE 50 MG: 50 TABLET ORAL at 17:49

## 2018-07-12 RX ADMIN — SODIUM CHLORIDE 50 ML/HR: 900 INJECTION, SOLUTION INTRAVENOUS at 12:04

## 2018-07-12 RX ADMIN — OXYCODONE HYDROCHLORIDE 5 MG: 5 TABLET ORAL at 23:59

## 2018-07-12 RX ADMIN — SERTRALINE HYDROCHLORIDE 50 MG: 50 TABLET ORAL at 12:06

## 2018-07-12 RX ADMIN — CEFTRIAXONE 1 G: 1 INJECTION, POWDER, FOR SOLUTION INTRAMUSCULAR; INTRAVENOUS at 15:48

## 2018-07-12 RX ADMIN — OXYCODONE HYDROCHLORIDE 2.5 MG: 5 TABLET ORAL at 10:17

## 2018-07-12 RX ADMIN — ACETAMINOPHEN 1000 MG: 500 TABLET, FILM COATED ORAL at 10:17

## 2018-07-12 NOTE — PROGRESS NOTES
Bedside and Verbal shift change report given to [address] [place] [address] (oncoming nurse) by Shirley Bauer (offgoing nurse). Report included the following information SBAR, Kardex, Intake/Output, MAR and Recent Results.

## 2018-07-12 NOTE — IP AVS SNAPSHOT
[address] [facility] 
[phone] Patient: Emmanuelle Orozco MRN: [id number] DCQ:2/43/1201 About your hospitalization You were admitted on:  July 12, 2018 You last received care in the:  [facility] 3 ORTHOPEDICS You were discharged on:  July 15, 2018 Why you were hospitalized Your primary diagnosis was:  Not on File Your diagnoses also included:  Shoulder Dislocation, Right, Initial Encounter Follow-up Information Follow up With Details Comments Contact Info Tyson Zamarripa MD   [address] [facility] [address] [facility] [address] 
[phone] Discharge Orders None A check baudilio indicates which time of day the medication should be taken. My Medications START taking these medications Instructions Each Dose to Equal  
 Morning Noon Evening Bedtime  
 ciprofloxacin HCl 250 mg tablet Commonly known as:  CIPRO Your last dose was: Your next dose is: Take 2 Tabs by mouth every twelve (12) hours for 4 days. 500 mg  
    
   
   
   
  
 senna-docusate 8.6-50 mg per tablet Commonly known as:  Erika Aviles Your last dose was: Your next dose is: Take 1 Tab by mouth two (2) times a day. 1 Tab CONTINUE taking these medications Instructions Each Dose to Equal  
 Morning Noon Evening Bedtime  
 lisinopril-hydroCHLOROthiazide 10-12.5 mg per tablet Commonly known as:  Tea Laird Your last dose was: Your next dose is: Take 1 Tab by mouth daily. 1 Tab  
    
   
   
   
  
 omeprazole 40 mg capsule Commonly known as:  PRILOSEC Your last dose was: Your next dose is: Take 40 mg by mouth daily as needed. 40 mg  
    
   
   
   
  
 sertraline 100 mg tablet Commonly known as:  ZOLOFT Your last dose was:     
   
Your next dose is: Take 50 mg by mouth two (2) times a day. 50 mg  
    
   
   
   
  
 simvastatin 20 mg tablet Commonly known as:  ZOCOR Your last dose was: Your next dose is: Take 20 mg by mouth nightly. 20 mg  
    
   
   
   
  
  
STOP taking these medications HYDROcodone-acetaminophen 7.5-325 mg per tablet Commonly known as:  NORCO  
   
  
 meloxicam 15 mg tablet Commonly known as:  MOBIC  
   
  
 traMADol 50 mg tablet Commonly known as:  ULTRAM  
   
  
  
  
Where to Get Your Medications Information on where to get these meds will be given to you by the nurse or doctor. ! Ask your nurse or doctor about these medications  
  ciprofloxacin HCl 250 mg tablet  
 senna-docusate 8.6-50 mg per tablet Discharge Instructions Arnaud Smith M.D. 
         Knee & Shoulder Surgery [facility] Surgery: Total Shoulder Replacement Surgeon: Kacie Walters MD 
Surgery Date:  7/13/2018 Going Home from the [place] can let your arm hang loosely by your side, but avoid actively lifting the surgical arm. You may perform Codman dangles and shoulder blade pinching exercises as instructed. Do not put weight on the affected arm. Do not lean on it, and do not hold objects with that hand. In general for the first week keep arm in sling, rest, perform simple stretching exercises and ice your shoulder. Common Post-op Concerns Hand swelling: Adjust sling, squeeze a stress ball, do biceps curls to help pump  the fluid out of your arm. Call the office if severe and we can see you to wrap  your hand/arm and send you for hand therapy. Bruising: Very common and will subside over 2-3 weeks. Nausea: If you have severe nausea call the office and a medicine can be called  in. Often times reducing pain medicine doses may provide relief.  
 
 Fever: Somewhat common the first 3 days after surgery, take Tylenol Sling You should wear the sling most of the time until further instructions at your follow up appointment. You may need to adjust the front shoulder strap so that your hand is slightly above your elbow, this will prevent some of the hand swelling that is common after surgery. You may remove the sling when sitting down and allow your arm to rest in your lap. You may take the sling off to shower. To relieve pain:  Use ice/gel packs.  Put the ice pack directly over the wound, or anywhere you are hurting or swollen.  To control pain and swelling, keep ice on regularly, especially after physical activity.  The packs should stay cold for 3-4 hours. When it is not cold anymore, rotate with the packs in the freezer.   
 Rest for at least 20 minutes between activity or exercises.  You will be sent home with oxycodone for pain medicine.  To keep track of your pain medications, write down what you take and when you take it.  The last dose of pain medication you got in the hospital was:    
 
Medication Dose Date & Time  Choose your medications based on the pain scale below:  To keep your pain under control, take Tylenol every 6 hours for 14 days - even if you feel like you dont need it.  For mild to moderate pain (1-6 on pain scale), take one pain pill every 3-4 hours as needed.  For severe pain (7-10 on pain scale), take two pain pills every 3-4 hours as needed.  To prevent nausea, take your pain medications with food. Pain Scale  As your pain lessens:  Slowly start taking less pain medication. You may do this by waiting longer between doses or by taking smaller doses.  Stop using the pain medications as soon as you no longer need it, usually in 2-3 weeks.  
 
 
 Generally after shoulder surgery, ambulation or walking around and being active is the best prevention for blood clots.  If you are at risk for blood clots due to your inability to walk around or have had blood clots in the past you may take Aspirin 325 mg once a day for 2-3 weeks to lower the chance of developing a blood clot.  To prevent stomach upset or bleeding:  Do not take non-steroidal anti-inflammatory medications (Ibuprofen, Advil, Motrin, Naproxen, etc.)  Take Pepcid 20 mg twice a day, or a similar home medication, while you are taking a blood thinner. OPSITE (Honeycomb dressing)  Keep your clear, waterproof dressing in place for 5-7 days after your leave the hospital. 
 
 If you are still having drainage, you will need to change your dressing in 5-7 days. You will be given one extra dressing to use at home.  If there is no more drainage from the wound, you may leave it open to the air.    Your staples will be removed at your 1st postop appointment usually about 10 days after surgery. OPSITE DRESSING INSTRUCTIONS  To increase and promote healing:  Stop Smoking (or at least cut back on 
     Smoking).  Eat a well-balanced diet (high in protein 
     and vitamin C).  If you have a poor appetite, drink Ensure, Glucerna, or  
      Pyote Instant Breakfast for the next 30 days.  If you are diabetic, you should control your blood  
      sugars to prevent infection and help your wound  
      to heal. 
               
 
 
 
 To prevent constipation, stay active and drink plenty of fluid.  While using pain medications, you should also take stool softeners and laxatives, such as Pericolace and Miralax.  If you are having too many bowel Movements, then you may need 
     to stop taking the laxatives.  You should have a bowel movement 3-4 days  
     after surgery and then at least every other day while 
     on pain medication.  
 
 
 Take short walks (in your home)  
      about every 1-2 hours during the day.  Try to increase how far you walk each day.  NO DRIVING until your surgeon tells you it is ok. Rehabilitation Healing is the main focus during the early phase of your rehabilitation. This means protecting the shoulder and only performing elbow/hand/wrist exercises to prevent stiffness. · Weeks 0-3:  You should begin dangle exercises the day after surgery. Work at this for 5 repetitions about 5 times a day. You may also begin active-assisted forward flexion at this time by starting to lift your operative arm with your good arm there to help push it up. At this time avoid moving your arm out to the side or externally rotating. · Weeks 3-6: You may begin active forward flexion at this time and continue using your other arm to help raise the operative arm. You may be set up to see a physical therapist to work on your range of motion at this time. · Weeks 6-12: Focus is on actively lifting your arm and achieving full range of motion and beginning with some light weight lifting. You may now begin externally rotating your arm and gently strengthening. At 2 months weight bearing on the arm is now permitted for the use of walkers, pushing up out of a chair, etc.  
 
 
 Please call your physician immediately if you have: 
 
 Constant bleeding from your wound.  Increasing redness or swelling around your wound (Some warmth, bruising and swelling is normal).  Change in wound drainage (increase in amount, color, or bad smell).  Change in mental status (unusual behavior  Temperature over 101.5 degrees Fahrenheit  Pain or redness in the calf (back of your lower leg  see picture)  Increased swelling of the thigh, ankle, calf, or foot.  Emergency: CALL 911 if you have:  Shortness of breath  Chest pain when you cough or taking a deep breath A follow up appointment card will be given to you or your family member after the surgery. If you are not aware of your follow up time or lost the card, please call the office at [phone].  If you have questions or concerns during normal business hours, you may reach Dr. Daniel Montalvo team at [phone]. If you have immediate concerns or questions you may call the office and reach Dr Daniel Montalvo or another [facility] provider who is on call. [phone] Introducing [facility] & HE[facility]! [facility] introduces [employer] patient portal. Now you can access parts of your medical record, email your doctor's office, and request medication refills online. 1. In your internet browser, go to [email or web address] 2. Click on the First Time User? Click Here link in the Sign In box. You will see the New Member Sign Up page. 3. Enter your [employer] Access Code exactly as it appears below. You will not need to use this code after youve completed the sign-up process. If you do not sign up before the expiration date, you must request a new code. · [employer] Access Code: [email or web address] Expires: 8/12/2018  2:21 PM 
 
4. Enter the last four digits of your Social Security Number (xxxx) and Date of Birth (mm/dd/yyyy) as indicated and click Submit. You will be taken to the next sign-up page. 5. Create a [employer] Project ID. This will be your [employer] login ID and cannot be changed, so think of one that is secure and easy to remember. 6. Create a [employer] password. You can change your password at any time. 7. Enter your Password Reset Question and Answer. This can be used at a later time if you forget your password. 8. Enter your e-mail address. You will receive e-mail notification when new information is available in [address] 9. Click Sign Up. You can now view and download portions of your medical record. 10. Click the Download Summary menu link to download a portable copy of your medical information.  
 
If you have questions, please visit the Frequently Asked Questions section of the [employer] website. Remember, [employer] is NOT to be used for urgent needs. For medical emergencies, dial 911. Now available from your iPhone and Android! Introducing Earl King As a [employer] patient, I wanted to make you aware of our electronic visit tool called Earl King. [employer] 24/7 allows you to connect within minutes with a medical provider 24 hours a day, seven days a week via a mobile device or tablet or logging into a secure website from your computer. You can access Earl King from anywhere in the [place]. A virtual visit might be right for you when you have a simple condition and feel like you just dont want to get out of bed, or cant get away from work for an appointment, when your regular [employer] provider is not available (evenings, weekends or holidays), or when youre out of town and need minor care. Electronic visits cost only $49 and if the [employer] 24/7 provider determines a prescription is needed to treat your condition, one can be electronically transmitted to a nearby pharmacy*. Please take a moment to enroll today if you have not already done so. The enrollment process is free and takes just a few minutes. To enroll, please download the [employer] 24/7 renea to your tablet or phone, or visit [email or web address] to enroll on your computer. And, as an [address] [place] [address] patient with a [employer] account, the results of your visits will be scanned into your electronic medical record and your primary care provider will be able to view the scanned results. We urge you to continue to see your regular [employer] provider for your ongoing medical care.   And while your primary care provider may not be the one available when you seek a Earl King virtual visit, the peace of mind you get from getting a real diagnosis real time can be harlan. For more information on Earl Ashleonardo, view our Frequently Asked Questions (FAQs) at [email or web address]. Sincerely, 
 
Cecilio Burt MD 
Chief Medical Officer [facility] Tara Anne *:  certain medications cannot be prescribed via Earl Ashleonardo Unresulted Labs-Please follow up with your PCP about these lab tests Order Current Status CULTURE, ANAEROBIC Preliminary result CULTURE, WOUND W GRAM STAIN Preliminary result Providers Seen During Your Hospitalization Provider Specialty Primary office phone Srikanth Turcios MD Orthopedic Surgery [phone] Your Primary Care Physician (PCP) Primary Care Physician Office Phone Office Fax Hina Nicolas [phone] You are allergic to the following No active allergies Recent Documentation Height Weight Breastfeeding? BMI OB Status Smoking Status 1.549 m 70.3 kg No 29.29 kg/m2 Hysterectomy Never Smoker Emergency Contacts Name Discharge Info Relation Home Work Mobile RamonKeon DISCHARGE CAREGIVER [3] Spouse [3] [phone] Patient Belongings The following personal items are in your possession at time of discharge: 
  Dental Appliances: Lowers  Visual Aid: Glasses      Home Medications: None   Jewelry: None  Clothing: None    Other Valuables: None  Personal Items Sent to Safe: none Please provide this summary of care documentation to your next provider. Signatures-by signing, you are acknowledging that this After Visit Summary has been reviewed with you and you have received a copy. Patient Signature:  ____________________________________________________________ Date:  ____________________________________________________________  
  
Eula Sharma Provider Signature:  ____________________________________________________________  Date: ____________________________________________________________

## 2018-07-12 NOTE — IP AVS SNAPSHOT
[address] [facility] 
[phone] Patient: Nasrin Pepe MRN: [id number] YBX:8/44/5667 A check baudilio indicates which time of day the medication should be taken. My Medications START taking these medications Instructions Each Dose to Equal  
 Morning Noon Evening Bedtime  
 ciprofloxacin HCl 250 mg tablet Commonly known as:  CIPRO Your last dose was: Your next dose is: Take 2 Tabs by mouth every twelve (12) hours for 4 days. 500 mg  
    
   
   
   
  
 senna-docusate 8.6-50 mg per tablet Commonly known as:  Nai Barragan Your last dose was: Your next dose is: Take 1 Tab by mouth two (2) times a day. 1 Tab CONTINUE taking these medications Instructions Each Dose to Equal  
 Morning Noon Evening Bedtime  
 lisinopril-hydroCHLOROthiazide 10-12.5 mg per tablet Commonly known as:  Davinburt Ang Your last dose was: Your next dose is: Take 1 Tab by mouth daily. 1 Tab  
    
   
   
   
  
 omeprazole 40 mg capsule Commonly known as:  PRILOSEC Your last dose was: Your next dose is: Take 40 mg by mouth daily as needed. 40 mg  
    
   
   
   
  
 sertraline 100 mg tablet Commonly known as:  ZOLOFT Your last dose was: Your next dose is: Take 50 mg by mouth two (2) times a day. 50 mg  
    
   
   
   
  
 simvastatin 20 mg tablet Commonly known as:  ZOCOR Your last dose was: Your next dose is: Take 20 mg by mouth nightly. 20 mg  
    
   
   
   
  
  
STOP taking these medications HYDROcodone-acetaminophen 7.5-325 mg per tablet Commonly known as:  NORCO  
   
  
 meloxicam 15 mg tablet Commonly known as:  MOBIC  
   
  
 traMADol 50 mg tablet Commonly known as:  ULTRAM  
   
  
  
  
Where to Get Your Medications Information on where to get these meds will be given to you by the nurse or doctor. ! Ask your nurse or doctor about these medications  
  ciprofloxacin HCl 250 mg tablet  
 senna-docusate 8.6-50 mg per tablet

## 2018-07-12 NOTE — PROGRESS NOTES
Ortho    Pre Op labs, Urine returned with 4+ bacteria, positive LE and Nitrites. Will begin rocephin 1g IV q24.   Hospitalist consult to advise treatment of UTI pending culture and po for discharge  Still planning for revision reverse TSA 7/13  NPO midnight          Venu Trinidad PA-C

## 2018-07-12 NOTE — H&P
GENERIC PRE-OP HISTORY AND PHYSICAL    Subjective:     Patient is a 74 y.o.  female presented with a history of Right reverse total shoulder arthroplasty on 06/01/2018 with acute onset of pain  Yesterday and feeling like her shoulder was loose. She was seen at an [facility] facility and had x-rays that showed dislocated reverse total shoulder arthroplasty hardwate. She is being direct admitted from our office for surgical management of this condition. Patient Active Problem List    Diagnosis Date Noted    Shoulder dislocation, right, initial encounter 07/12/2018     Priority: 1 - One    Status post reverse arthroplasty of right shoulder 07/12/2018    Osteoarthritis of right shoulder 06/01/2018    Rotator cuff arthropathy, right 05/31/2018     Past Medical History:   Diagnosis Date    Arthritis     Diabetes ([address]     Gall stones     h/o    GERD (gastroesophageal reflux disease)     High cholesterol     Hypertension     Insomnia     Migraines     Nausea & vomiting     Psychiatric disorder     depression      Past Surgical History:   Procedure Laterality Date    HX APPENDECTOMY      HX CHOLECYSTECTOMY      HX HERNIA REPAIR  08/2017, 3/21/18    ventral hernia with mesh    HX HYSTERECTOMY      HX KNEE REPLACEMENT      bilateral    HX OOPHORECTOMY      HX ORTHOPAEDIC      rt ankle    HX ORTHOPAEDIC      joints scrapped bilat. hands due to arthritis    HX SHOULDER REPLACEMENT Right 06/01/2018    RIGHT REVERSE SHOULDER ARTHROPLASTY, AXILLARY NERVE NEUROPLASTY, BICEPS TENODESIS    HX UROLOGICAL      rectal and bladder tack      Prior to Admission medications    Medication Sig Start Date End Date Taking? Authorizing Provider   lisinopril-hydroCHLOROthiazide (PRINZIDE, ZESTORETIC) 10-12.5 mg per tablet Take 1 Tab by mouth daily. Historical Provider   HYDROcodone-acetaminophen (NORCO) 7.5-325 mg per tablet Take 1 Tab by mouth every six (6) hours as needed for Pain.     Historical Provider meloxicam (MOBIC) 15 mg tablet Take 15 mg by mouth daily. Historical Provider   metFORMIN (GLUCOPHAGE) 500 mg tablet Take 500 mg by mouth daily (with breakfast). Historical Provider   omeprazole (PRILOSEC) 40 mg capsule Take 40 mg by mouth as needed. Historical Provider   sertraline (ZOLOFT) 100 mg tablet Take 50 mg by mouth two (2) times a day. Historical Provider   simvastatin (ZOCOR) 20 mg tablet Take 20 mg by mouth nightly. Historical Provider     No Known Allergies   Social History   Substance Use Topics    Smoking status: Never Smoker    Smokeless tobacco: Never Used    Alcohol use No      No family history on file. Review of Systems  A comprehensive review of systems was negative except for that written in the HPI. Objective:     Patient Vitals for the past 8 hrs:   BP Temp Pulse Resp SpO2   07/12/18 0932 130/89 98 °F (36.7 °C) 89 16 95 %      right shoulder exam reveals palpable mass in the anterior shoulder, limited range of motion due to pain. Right upper extremity neurovascularly intact,  Intact axillary nerve sensation, wrist extension, thumb extension    Imaging Review  2 views of the right shoulder show a dislocated Reverse TSA hardware    Assessment:     Active Problems:    Shoulder dislocation, right, initial encounter (7/12/2018)        Plan: We will plan for revision reverse TSA tomorrow. The various methods of treatment have been discussed with the patient and family. After consideration of risks, benefits and other options for treatment, the patient has consented to surgical interventions. Questions were answered and Pre-op teaching was done.

## 2018-07-13 ENCOUNTER — ANESTHESIA (OUTPATIENT)
Dept: SURGERY | Age: 73
DRG: 483 | End: 2018-07-13
Payer: MEDICARE

## 2018-07-13 LAB
BACTERIA SPEC CULT: NORMAL
BACTERIA SPEC CULT: NORMAL
GLUCOSE BLD STRIP.AUTO-MCNC: 101 MG/DL (ref 65–100)
GLUCOSE BLD STRIP.AUTO-MCNC: 104 MG/DL (ref 65–100)
GLUCOSE BLD STRIP.AUTO-MCNC: 133 MG/DL (ref 65–100)
GLUCOSE BLD STRIP.AUTO-MCNC: 97 MG/DL (ref 65–100)
SERVICE CMNT-IMP: ABNORMAL
SERVICE CMNT-IMP: NORMAL
SERVICE CMNT-IMP: NORMAL

## 2018-07-13 PROCEDURE — 77030011640 HC PAD GRND REM COVD -A: Performed by: ORTHOPAEDIC SURGERY

## 2018-07-13 PROCEDURE — 77030038020 HC MANFLD NEPTUNE STRY -B: Performed by: ORTHOPAEDIC SURGERY

## 2018-07-13 PROCEDURE — 82962 GLUCOSE BLOOD TEST: CPT

## 2018-07-13 PROCEDURE — 77030020788: Performed by: ORTHOPAEDIC SURGERY

## 2018-07-13 PROCEDURE — 74011000258 HC RX REV CODE- 258: Performed by: PHYSICIAN ASSISTANT

## 2018-07-13 PROCEDURE — 74011250637 HC RX REV CODE- 250/637: Performed by: PHYSICIAN ASSISTANT

## 2018-07-13 PROCEDURE — 74011250636 HC RX REV CODE- 250/636

## 2018-07-13 PROCEDURE — 74011250637 HC RX REV CODE- 250/637: Performed by: NURSE PRACTITIONER

## 2018-07-13 PROCEDURE — 74011250636 HC RX REV CODE- 250/636: Performed by: PHYSICIAN ASSISTANT

## 2018-07-13 PROCEDURE — 76030000019 HC AMB SURG 1 TO 1.5 HR INTENSV-TIER 1: Performed by: ORTHOPAEDIC SURGERY

## 2018-07-13 PROCEDURE — 65270000029 HC RM PRIVATE

## 2018-07-13 PROCEDURE — 77030008684 HC TU ET CUF COVD -B: Performed by: NURSE ANESTHETIST, CERTIFIED REGISTERED

## 2018-07-13 PROCEDURE — 77030020255 HC SOL INJ LR 1000ML BG: Performed by: ORTHOPAEDIC SURGERY

## 2018-07-13 PROCEDURE — 77030002912 HC SUT ETHBND J&J -A: Performed by: ORTHOPAEDIC SURGERY

## 2018-07-13 PROCEDURE — 77030019908 HC STETH ESOPH SIMS -A: Performed by: NURSE ANESTHETIST, CERTIFIED REGISTERED

## 2018-07-13 PROCEDURE — 77030031139 HC SUT VCRL2 J&J -A: Performed by: ORTHOPAEDIC SURGERY

## 2018-07-13 PROCEDURE — 74011000250 HC RX REV CODE- 250: Performed by: ORTHOPAEDIC SURGERY

## 2018-07-13 PROCEDURE — 77030018846 HC SOL IRR STRL H20 ICUM -A: Performed by: ORTHOPAEDIC SURGERY

## 2018-07-13 PROCEDURE — 76210000034 HC AMBSU PH I REC 0.5 TO 1 HR: Performed by: ORTHOPAEDIC SURGERY

## 2018-07-13 PROCEDURE — 87075 CULTR BACTERIA EXCEPT BLOOD: CPT | Performed by: ORTHOPAEDIC SURGERY

## 2018-07-13 PROCEDURE — 0RRJ0J6 REPLACEMENT OF RIGHT SHOULDER JOINT WITH SYNTHETIC SUBSTITUTE, HUMERAL SURFACE, OPEN APPROACH: ICD-10-PCS | Performed by: ORTHOPAEDIC SURGERY

## 2018-07-13 PROCEDURE — 0RWJ0JZ REVISION OF SYNTHETIC SUBSTITUTE IN RIGHT SHOULDER JOINT, OPEN APPROACH: ICD-10-PCS | Performed by: ORTHOPAEDIC SURGERY

## 2018-07-13 PROCEDURE — 77030018836 HC SOL IRR NACL ICUM -A: Performed by: ORTHOPAEDIC SURGERY

## 2018-07-13 PROCEDURE — 87205 SMEAR GRAM STAIN: CPT | Performed by: ORTHOPAEDIC SURGERY

## 2018-07-13 PROCEDURE — 0RPJ0JZ REMOVAL OF SYNTHETIC SUBSTITUTE FROM RIGHT SHOULDER JOINT, OPEN APPROACH: ICD-10-PCS | Performed by: ORTHOPAEDIC SURGERY

## 2018-07-13 PROCEDURE — 77030013708 HC HNDPC SUC IRR PULS STRY –B: Performed by: ORTHOPAEDIC SURGERY

## 2018-07-13 PROCEDURE — 77030026438 HC STYL ET INTUB CARD -A: Performed by: NURSE ANESTHETIST, CERTIFIED REGISTERED

## 2018-07-13 PROCEDURE — C1776 JOINT DEVICE (IMPLANTABLE): HCPCS | Performed by: ORTHOPAEDIC SURGERY

## 2018-07-13 PROCEDURE — 77030006835 HC BLD SAW SAG STRY -B: Performed by: ORTHOPAEDIC SURGERY

## 2018-07-13 PROCEDURE — 77030002922 HC SUT FBRWRE ARTH -B: Performed by: ORTHOPAEDIC SURGERY

## 2018-07-13 PROCEDURE — 76210000050 HC AMBSU PH II REC 0.5 TO 1 HR: Performed by: ORTHOPAEDIC SURGERY

## 2018-07-13 PROCEDURE — 74011250636 HC RX REV CODE- 250/636: Performed by: ANESTHESIOLOGY

## 2018-07-13 PROCEDURE — 77030010516 HC APPL HEMA CLP TELE -B: Performed by: ORTHOPAEDIC SURGERY

## 2018-07-13 PROCEDURE — 77030020061 HC IV BLD WRMR ADMIN SET 3M -B: Performed by: NURSE ANESTHETIST, CERTIFIED REGISTERED

## 2018-07-13 PROCEDURE — 74011250636 HC RX REV CODE- 250/636: Performed by: ORTHOPAEDIC SURGERY

## 2018-07-13 PROCEDURE — 77030018673: Performed by: ORTHOPAEDIC SURGERY

## 2018-07-13 PROCEDURE — 76060000062 HC AMB SURG ANES 1 TO 1.5 HR: Performed by: ORTHOPAEDIC SURGERY

## 2018-07-13 PROCEDURE — 77030021352 HC CBL LD SYS DISP COVD -B: Performed by: ORTHOPAEDIC SURGERY

## 2018-07-13 RX ORDER — LIDOCAINE HYDROCHLORIDE 10 MG/ML
0.1 INJECTION, SOLUTION EPIDURAL; INFILTRATION; INTRACAUDAL; PERINEURAL AS NEEDED
Status: DISCONTINUED | OUTPATIENT
Start: 2018-07-13 | End: 2018-07-13 | Stop reason: HOSPADM

## 2018-07-13 RX ORDER — SODIUM CHLORIDE, SODIUM LACTATE, POTASSIUM CHLORIDE, CALCIUM CHLORIDE 600; 310; 30; 20 MG/100ML; MG/100ML; MG/100ML; MG/100ML
25 INJECTION, SOLUTION INTRAVENOUS CONTINUOUS
Status: DISCONTINUED | OUTPATIENT
Start: 2018-07-13 | End: 2018-07-13 | Stop reason: HOSPADM

## 2018-07-13 RX ORDER — OXYCODONE HYDROCHLORIDE 5 MG/1
10 TABLET ORAL
Status: DISCONTINUED | OUTPATIENT
Start: 2018-07-13 | End: 2018-07-15 | Stop reason: HOSPADM

## 2018-07-13 RX ORDER — DIPHENHYDRAMINE HYDROCHLORIDE 50 MG/ML
12.5 INJECTION, SOLUTION INTRAMUSCULAR; INTRAVENOUS
Status: ACTIVE | OUTPATIENT
Start: 2018-07-13 | End: 2018-07-14

## 2018-07-13 RX ORDER — ONDANSETRON 2 MG/ML
4 INJECTION INTRAMUSCULAR; INTRAVENOUS
Status: ACTIVE | OUTPATIENT
Start: 2018-07-13 | End: 2018-07-14

## 2018-07-13 RX ORDER — ROPIVACAINE HYDROCHLORIDE 5 MG/ML
INJECTION, SOLUTION EPIDURAL; INFILTRATION; PERINEURAL
Status: DISCONTINUED
Start: 2018-07-13 | End: 2018-07-13

## 2018-07-13 RX ORDER — CEFAZOLIN SODIUM/WATER 2 G/20 ML
2 SYRINGE (ML) INTRAVENOUS ONCE
Status: COMPLETED | OUTPATIENT
Start: 2018-07-13 | End: 2018-07-13

## 2018-07-13 RX ORDER — SODIUM CHLORIDE, SODIUM LACTATE, POTASSIUM CHLORIDE, CALCIUM CHLORIDE 600; 310; 30; 20 MG/100ML; MG/100ML; MG/100ML; MG/100ML
25 INJECTION, SOLUTION INTRAVENOUS CONTINUOUS
Status: DISCONTINUED | OUTPATIENT
Start: 2018-07-13 | End: 2018-07-13

## 2018-07-13 RX ORDER — SODIUM CHLORIDE 0.9 % (FLUSH) 0.9 %
5-10 SYRINGE (ML) INJECTION EVERY 8 HOURS
Status: DISCONTINUED | OUTPATIENT
Start: 2018-07-13 | End: 2018-07-13 | Stop reason: HOSPADM

## 2018-07-13 RX ORDER — SODIUM CHLORIDE 0.9 % (FLUSH) 0.9 %
5-10 SYRINGE (ML) INJECTION AS NEEDED
Status: DISCONTINUED | OUTPATIENT
Start: 2018-07-13 | End: 2018-07-13

## 2018-07-13 RX ORDER — CEFAZOLIN SODIUM/WATER 2 G/20 ML
2 SYRINGE (ML) INTRAVENOUS EVERY 8 HOURS
Status: COMPLETED | OUTPATIENT
Start: 2018-07-13 | End: 2018-07-14

## 2018-07-13 RX ORDER — OXYCODONE HYDROCHLORIDE 5 MG/1
5 TABLET ORAL
Status: DISCONTINUED | OUTPATIENT
Start: 2018-07-13 | End: 2018-07-15 | Stop reason: HOSPADM

## 2018-07-13 RX ORDER — OXYCODONE AND ACETAMINOPHEN 5; 325 MG/1; MG/1
1 TABLET ORAL
Status: DISCONTINUED | OUTPATIENT
Start: 2018-07-13 | End: 2018-07-13

## 2018-07-13 RX ORDER — SODIUM CHLORIDE 0.9 % (FLUSH) 0.9 %
5-10 SYRINGE (ML) INJECTION AS NEEDED
Status: DISCONTINUED | OUTPATIENT
Start: 2018-07-13 | End: 2018-07-13 | Stop reason: HOSPADM

## 2018-07-13 RX ORDER — PROPOFOL 10 MG/ML
INJECTION, EMULSION INTRAVENOUS AS NEEDED
Status: DISCONTINUED | OUTPATIENT
Start: 2018-07-13 | End: 2018-07-13 | Stop reason: HOSPADM

## 2018-07-13 RX ORDER — DIPHENHYDRAMINE HYDROCHLORIDE 50 MG/ML
12.5 INJECTION, SOLUTION INTRAMUSCULAR; INTRAVENOUS AS NEEDED
Status: DISCONTINUED | OUTPATIENT
Start: 2018-07-13 | End: 2018-07-13

## 2018-07-13 RX ORDER — FENTANYL CITRATE 50 UG/ML
INJECTION, SOLUTION INTRAMUSCULAR; INTRAVENOUS
Status: COMPLETED
Start: 2018-07-13 | End: 2018-07-13

## 2018-07-13 RX ORDER — ACETAMINOPHEN 500 MG
500 TABLET ORAL EVERY 6 HOURS
Status: DISCONTINUED | OUTPATIENT
Start: 2018-07-13 | End: 2018-07-15 | Stop reason: HOSPADM

## 2018-07-13 RX ORDER — AMOXICILLIN 250 MG
1 CAPSULE ORAL 2 TIMES DAILY
Status: DISCONTINUED | OUTPATIENT
Start: 2018-07-13 | End: 2018-07-15 | Stop reason: HOSPADM

## 2018-07-13 RX ORDER — MIDAZOLAM HYDROCHLORIDE 1 MG/ML
INJECTION, SOLUTION INTRAMUSCULAR; INTRAVENOUS
Status: COMPLETED
Start: 2018-07-13 | End: 2018-07-13

## 2018-07-13 RX ORDER — POLYETHYLENE GLYCOL 3350 17 G/17G
17 POWDER, FOR SOLUTION ORAL DAILY
Status: DISCONTINUED | OUTPATIENT
Start: 2018-07-14 | End: 2018-07-15 | Stop reason: HOSPADM

## 2018-07-13 RX ORDER — FENTANYL CITRATE 50 UG/ML
INJECTION, SOLUTION INTRAMUSCULAR; INTRAVENOUS AS NEEDED
Status: DISCONTINUED | OUTPATIENT
Start: 2018-07-13 | End: 2018-07-13 | Stop reason: HOSPADM

## 2018-07-13 RX ORDER — LISINOPRIL 5 MG/1
10 TABLET ORAL DAILY
Status: DISCONTINUED | OUTPATIENT
Start: 2018-07-13 | End: 2018-07-15 | Stop reason: HOSPADM

## 2018-07-13 RX ORDER — SUCCINYLCHOLINE CHLORIDE 20 MG/ML
INJECTION INTRAMUSCULAR; INTRAVENOUS AS NEEDED
Status: DISCONTINUED | OUTPATIENT
Start: 2018-07-13 | End: 2018-07-13 | Stop reason: HOSPADM

## 2018-07-13 RX ORDER — LIDOCAINE HYDROCHLORIDE AND EPINEPHRINE 10; 10 MG/ML; UG/ML
INJECTION, SOLUTION INFILTRATION; PERINEURAL AS NEEDED
Status: DISCONTINUED | OUTPATIENT
Start: 2018-07-13 | End: 2018-07-13 | Stop reason: HOSPADM

## 2018-07-13 RX ORDER — FAMOTIDINE 20 MG/1
20 TABLET, FILM COATED ORAL DAILY
Status: DISCONTINUED | OUTPATIENT
Start: 2018-07-14 | End: 2018-07-15 | Stop reason: HOSPADM

## 2018-07-13 RX ORDER — HYDROMORPHONE HYDROCHLORIDE 1 MG/ML
.2-.5 INJECTION, SOLUTION INTRAMUSCULAR; INTRAVENOUS; SUBCUTANEOUS ONCE
Status: DISCONTINUED | OUTPATIENT
Start: 2018-07-13 | End: 2018-07-13

## 2018-07-13 RX ORDER — MORPHINE SULFATE 10 MG/ML
2 INJECTION, SOLUTION INTRAMUSCULAR; INTRAVENOUS
Status: DISCONTINUED | OUTPATIENT
Start: 2018-07-13 | End: 2018-07-13

## 2018-07-13 RX ORDER — SODIUM CHLORIDE 0.9 % (FLUSH) 0.9 %
5-10 SYRINGE (ML) INJECTION AS NEEDED
Status: DISCONTINUED | OUTPATIENT
Start: 2018-07-13 | End: 2018-07-15 | Stop reason: HOSPADM

## 2018-07-13 RX ORDER — FENTANYL CITRATE 50 UG/ML
25 INJECTION, SOLUTION INTRAMUSCULAR; INTRAVENOUS
Status: DISCONTINUED | OUTPATIENT
Start: 2018-07-13 | End: 2018-07-13

## 2018-07-13 RX ORDER — NALOXONE HYDROCHLORIDE 0.4 MG/ML
0.4 INJECTION, SOLUTION INTRAMUSCULAR; INTRAVENOUS; SUBCUTANEOUS AS NEEDED
Status: DISCONTINUED | OUTPATIENT
Start: 2018-07-13 | End: 2018-07-15 | Stop reason: HOSPADM

## 2018-07-13 RX ORDER — SODIUM CHLORIDE 9 MG/ML
100 INJECTION, SOLUTION INTRAVENOUS CONTINUOUS
Status: DISPENSED | OUTPATIENT
Start: 2018-07-13 | End: 2018-07-14

## 2018-07-13 RX ORDER — HYDROMORPHONE HYDROCHLORIDE 2 MG/ML
0.5 INJECTION, SOLUTION INTRAMUSCULAR; INTRAVENOUS; SUBCUTANEOUS
Status: DISPENSED | OUTPATIENT
Start: 2018-07-13 | End: 2018-07-14

## 2018-07-13 RX ORDER — MIDAZOLAM HYDROCHLORIDE 1 MG/ML
INJECTION, SOLUTION INTRAMUSCULAR; INTRAVENOUS AS NEEDED
Status: DISCONTINUED | OUTPATIENT
Start: 2018-07-13 | End: 2018-07-13 | Stop reason: HOSPADM

## 2018-07-13 RX ORDER — SODIUM CHLORIDE 0.9 % (FLUSH) 0.9 %
5-10 SYRINGE (ML) INJECTION EVERY 8 HOURS
Status: DISCONTINUED | OUTPATIENT
Start: 2018-07-13 | End: 2018-07-15 | Stop reason: HOSPADM

## 2018-07-13 RX ADMIN — HYDROCODONE BITARTRATE AND ACETAMINOPHEN 1 TABLET: 7.5; 325 TABLET ORAL at 06:46

## 2018-07-13 RX ADMIN — FENTANYL CITRATE 50 MCG: 50 INJECTION, SOLUTION INTRAMUSCULAR; INTRAVENOUS at 12:25

## 2018-07-13 RX ADMIN — Medication 2 G: at 13:18

## 2018-07-13 RX ADMIN — SODIUM CHLORIDE 50 ML/HR: 900 INJECTION, SOLUTION INTRAVENOUS at 06:46

## 2018-07-13 RX ADMIN — LISINOPRIL 10 MG: 5 TABLET ORAL at 19:04

## 2018-07-13 RX ADMIN — CEFTRIAXONE 1 G: 1 INJECTION, POWDER, FOR SOLUTION INTRAMUSCULAR; INTRAVENOUS at 17:32

## 2018-07-13 RX ADMIN — ACETAMINOPHEN 500 MG: 500 TABLET, FILM COATED ORAL at 17:22

## 2018-07-13 RX ADMIN — SODIUM CHLORIDE, SODIUM LACTATE, POTASSIUM CHLORIDE, AND CALCIUM CHLORIDE 25 ML/HR: 600; 310; 30; 20 INJECTION, SOLUTION INTRAVENOUS at 12:13

## 2018-07-13 RX ADMIN — SUCCINYLCHOLINE CHLORIDE 100 MG: 20 INJECTION INTRAMUSCULAR; INTRAVENOUS at 12:59

## 2018-07-13 RX ADMIN — SERTRALINE HYDROCHLORIDE 50 MG: 50 TABLET ORAL at 17:23

## 2018-07-13 RX ADMIN — OXYCODONE HYDROCHLORIDE 5 MG: 5 TABLET ORAL at 17:22

## 2018-07-13 RX ADMIN — HYDROMORPHONE HYDROCHLORIDE 0.5 MG: 2 INJECTION, SOLUTION INTRAMUSCULAR; INTRAVENOUS; SUBCUTANEOUS at 19:05

## 2018-07-13 RX ADMIN — DOCUSATE SODIUM AND SENNOSIDES 1 TABLET: 8.6; 5 TABLET, FILM COATED ORAL at 17:23

## 2018-07-13 RX ADMIN — Medication 10 ML: at 21:06

## 2018-07-13 RX ADMIN — OXYCODONE HYDROCHLORIDE 10 MG: 5 TABLET ORAL at 21:06

## 2018-07-13 RX ADMIN — SODIUM CHLORIDE 100 ML/HR: 900 INJECTION, SOLUTION INTRAVENOUS at 17:24

## 2018-07-13 RX ADMIN — MIDAZOLAM HYDROCHLORIDE 0.5 MG: 1 INJECTION, SOLUTION INTRAMUSCULAR; INTRAVENOUS at 12:25

## 2018-07-13 RX ADMIN — Medication 10 ML: at 17:25

## 2018-07-13 RX ADMIN — ACETAMINOPHEN 500 MG: 500 TABLET, FILM COATED ORAL at 23:12

## 2018-07-13 RX ADMIN — Medication 2 G: at 21:06

## 2018-07-13 RX ADMIN — PROPOFOL 150 MG: 10 INJECTION, EMULSION INTRAVENOUS at 12:59

## 2018-07-13 NOTE — PERIOP NOTES
TRANSFER - IN REPORT:    Verbal report received from [address] on Alton Navarro  being received from Ortho(unit) for ordered procedure      Report consisted of patients Situation, Background, Assessment and   Recommendations(SBAR). Information from the following report(s) SBAR, Kardex and MAR was reviewed with the receiving nurse. Opportunity for questions and clarification was provided. Assessment completed upon patients arrival to unit and care assumed.        Nurse and Tech picked up patient from room via stretcher

## 2018-07-13 NOTE — PERIOP NOTES
Birgit Martin  1945  [id number]    Situation:  Verbal report given from: [facility]  Procedure: Procedure(s):  RIGHT REVERSE SHOULDER ARTHROPLASTY REVISION    Background:    Preoperative diagnosis: DISLOCATED RIGHT TOTAL SHOULDER    Postoperative diagnosis: DISLOCATED RIGHT TOTAL SHOULDER    :  Dr. Acosta Cedillo    Assistant(s): Circ-1: Verna Levine RN  Scrub Tech-1: Cheryl Charles  Scrub Tech-2: Pernell Montejo    Specimens:   ID Type Source Tests Collected by Time Destination   1 : right shoulder wound Wound  ANAEROBIC/AEROBIC/GRAM STAIN Maicol Monahan MD 7/13/2018 1335 Microbiology       Assessment:  Intra-procedure medications         Anesthesia gave intra-procedure sedation and medications, see anesthesia flow sheet     Intravenous fluids: LR@ KVO     Vital signs stable       Recommendation:    Permission to share finding with  tSefany Zeng

## 2018-07-13 NOTE — ANESTHESIA PROCEDURE NOTES
Peripheral Block    Start time: 7/13/2018 12:22 PM  End time: 7/13/2018 12:35 PM  Performed by: Ramon Mitchell  Authorized by: Ramon Mitchell       Pre-procedure: Indications: at surgeon's request and post-op pain management    Preanesthetic Checklist: patient identified, risks and benefits discussed, site marked, timeout performed, anesthesia consent given and patient being monitored    Timeout Time: 12:24          Block Type:   Block Type:   Interscalene  Laterality:  Right  Monitoring:  Standard ASA monitoring, oxygen and responsive to questions  Injection Technique:  Single shot  Procedures: ultrasound guided    Patient Position: supine  Prep: chlorhexidine    Location:  Interscalene  Needle Type:  Stimuplex  Needle Gauge:  22 G  Needle Localization:  Ultrasound guidance  Medication Injected:  0.5%  ropivacaine  Volume (mL):  30    Assessment:  Number of attempts:  1  Injection Assessment:  Incremental injection every 5 mL, no paresthesia, ultrasound image on chart, local visualized surrounding nerve on ultrasound, negative aspiration for blood and no intravascular symptoms  Patient tolerance:  Patient tolerated the procedure well with no immediate complications

## 2018-07-13 NOTE — ANESTHESIA PREPROCEDURE EVALUATION
Anesthetic History   No history of anesthetic complications            Review of Systems / Medical History  Patient summary reviewed, nursing notes reviewed and pertinent labs reviewed    Pulmonary        Sleep apnea           Neuro/Psych         Headaches (migraines) and psychiatric history (depression)     Cardiovascular    Hypertension              Exercise tolerance: >4 METS     GI/Hepatic/Renal     GERD: well controlled           Endo/Other    Diabetes ( taken off med): type 2    Arthritis     Other Findings   Comments: Dislocated right shoulder arthroplasty         Physical Exam    Airway  Mallampati: I  TM Distance: 4 - 6 cm  Neck ROM: normal range of motion   Mouth opening: Normal     Cardiovascular    Rhythm: regular  Rate: normal      Pertinent negatives: No murmur   Dental    Dentition: Full lower dentures     Pulmonary  Breath sounds clear to auscultation               Abdominal  GI exam deferred       Other Findings            Anesthetic Plan    ASA: 2  Anesthesia type: general and regional - interscalene block    Monitoring Plan: BIS  Post-op pain plan if not by surgeon: peripheral nerve block single    Induction: Intravenous  Anesthetic plan and risks discussed with: Patient      Pt is an inpatient.   Preop glucose

## 2018-07-13 NOTE — PERIOP NOTES
Dr. Giovanna Fletcher performed a interscalene block with ultrasound. Patient on continuous cardiac and pulse oximetry monitoring throughout the procedure, nasal cannula 3 liters. Patient received 0.5mg Versed and 50mcq  Fentanyl. Vital signs stable. Patient tolerated procedure well. Patient drowsy but arouses when spoken to. Will continue to monitor.

## 2018-07-13 NOTE — PERIOP NOTES
Gave  packet from Dr. Hendrix Points with followup appointment and RXs for Percocet and Zofran.  Stressed to  that this RX is for [employer] which has Tylenol in it so not to take Tylenol with her pain medication

## 2018-07-13 NOTE — PERIOP NOTES
1415  Patient clearing throat producing and and spitting out clear phelgm. 1435  Continues to clear throat of clear phlegm. Denies any pain. 1450 Denies pain. Some phlegm still produced from throat. States happened with last shoulder surgery.

## 2018-07-13 NOTE — BRIEF OP NOTE
BRIEF OPERATIVE NOTE    Date of Procedure: 7/13/2018   Preoperative Diagnosis: DISLOCATED RIGHT TOTAL SHOULDER  Postoperative Diagnosis: DISLOCATED RIGHT TOTAL SHOULDER    Procedure(s):  RIGHT REVERSE SHOULDER ARTHROPLASTY REVISION  Surgeon(s) and Role: * Willam Lou MD - Primary         Surgical Assistant: Iliana Mancilla PA-C  - Assist     Surgical Staff:  Circ-1: Shantel Bradshaw RN  Scrub Tech-1: Jeremy Templeton. Araceli  Scrub Tech-2: Luz Virk  Event Time In   Incision Start 1319   Incision Close      Anesthesia: General   Estimated Blood Loss: 40cc  Specimens:   ID Type Source Tests Collected by Time Destination   1 : right shoulder wound Wound  ANAEROBIC/AEROBIC/GRAM STAIN Willam Lou MD 7/13/2018 1335 Microbiology      Findings: see above   Complications: none  Implants:   Implant Name Type Inv.  Item Serial No.  Lot No. LRB No. Used Action   8mm monoblock spacer with retaining screw   NA  [id number] Right 1 Implanted   32mm humeral socket     NA   [id number] Right 1 Implanted

## 2018-07-13 NOTE — PERIOP NOTES
TRANSFER - OUT REPORT:    Verbal report given to Jennifer Gutierrez on [address] [facility]  being transferred to  for routine post - op       Report consisted of patients Situation, Background, Assessment and   Recommendations(SBAR). Information from the following report(s) SBAR, OR Summary and Intake/Output was reviewed with the receiving nurse. Opportunity for questions and clarification was provided.       Patient transported with:   Registered Nurse

## 2018-07-13 NOTE — ANESTHESIA POSTPROCEDURE EVALUATION
Post-Anesthesia Evaluation and Assessment    Patient: Kaylee Chaudhari MRN: [id number]  SSN: [id number]    YOB: 1945  Age: 74 y.o. Sex: female       Cardiovascular Function/Vital Signs  Visit Vitals    /89 (BP 1 Location: Left arm, BP Patient Position: At rest)    Pulse 73    Temp 36.6 °C (97.9 °F)    Resp 22    Ht 5' 1\" (1.549 m)    Wt 70.3 kg (155 lb)    SpO2 98%    Breastfeeding No    BMI 29.29 kg/m2       Patient is status post general, regional anesthesia for Procedure(s):  RIGHT REVERSE SHOULDER ARTHROPLASTY REVISION. Nausea/Vomiting: None    Postoperative hydration reviewed and adequate. Pain:  Pain Scale 1: Numeric (0 - 10) (07/13/18 1515)  Pain Intensity 1: 0 (07/13/18 1515)   Managed    Neurological Status:   Neuro (WDL): Exceptions to WDL (07/13/18 1406)  Neuro  Neurologic State: Alert (07/13/18 1515)   At baseline    Mental Status and Level of Consciousness: Arousable    Pulmonary Status:   O2 Device: Nasal cannula (07/13/18 1515)   Adequate oxygenation and airway patent    Complications related to anesthesia: None    Post-anesthesia assessment completed.  No concerns    Signed By: Marianne Jennings MD     July 13, 2018

## 2018-07-13 NOTE — CONSULTS
Hospitalist Consultation Note     NAME:                      Lacey Fajardo   :                                      MRN:                                   [id number]      Date/Time:                2018 4:31 PM     Patient PCP: Dania Steele MD     I have been asked to see this patient by the attending, Dr. Jose De Jesus Coles , for advice/opinion re: UTI. My advice is:  ________________________________________________________________________                        Assessment &  Plan:  UTI, POA  --patient not symptomatic but having right shoulder surgery tomorrow  --continue with rocephin 1g daily x 3 days (d#1). Follow up urine culture.       DM type 2  --A1c 5.8 . Now off medication; metformin discontinued last month by pcp. --put on low dose SSI.     HTN  --on low dose zestoretic. -130s. Hold medication preop to avoid hypotension     Hyperlipidemia  --continue zocor     Arthritis  Chronic pain  --on pain contract with pcp for hydrocodone and was in trouble last month when also took oxycodone prescribed after right shoulder arthroplasty.     Dislocation of right shoulder arthroplasty hardware  Right shoulder arthroplasty last month  --patient denies any trauma preceding dislocation. --to have surgery tomorrow.       CHIEF COMPLAINT:  \"right shoulder pain\"     HISTORY OF PRESENT ILLNESS:     Lacey Fajardo is a 74 y.o.  female with arthritis, diffuse myalgia and arthralgias who had right shoulder arthroplasty last month. Yesterday was carrying underclothes on arm when felt a pop and then severe pain. Seen at [facility] ER with xray showing dislocation of arthroplasty hardware and was directedly admitted today for surgical management.   UA shows uti and started on rocephin.     Denies fever, chills, dysuria, hematuria, abdominal pain, back pain.           Past Medical History:   Diagnosis Date    Arthritis      Blind right eye 2017    Diabetes ([address]      Gall stones       h/o    GERD (gastroesophageal reflux disease)      High cholesterol      Hypertension      Insomnia      Migraines      Nausea & vomiting      Psychiatric disorder       depression            Past Surgical History:   Procedure Laterality Date    HX APPENDECTOMY        HX CHOLECYSTECTOMY        HX HERNIA REPAIR   08/2017, 3/21/18     ventral hernia with mesh    HX HYSTERECTOMY        HX KNEE REPLACEMENT         bilateral    HX OOPHORECTOMY        HX ORTHOPAEDIC         rt ankle    HX ORTHOPAEDIC         joints scrapped bilat. hands due to arthritis    HX SHOULDER REPLACEMENT Right 06/01/2018     RIGHT REVERSE SHOULDER ARTHROPLASTY, AXILLARY NERVE NEUROPLASTY, BICEPS TENODESIS    HX UROLOGICAL         rectal and bladder tack           Social History   Substance Use Topics    Smoking status: Never Smoker    Smokeless tobacco: Never Used    Alcohol use No            Family History   Problem Relation Age of Onset    Emphysema Father        No Known Allergies              Prior to Admission medications    Medication Sig Start Date End Date Taking?  Authorizing Provider   HYDROcodone-acetaminophen (NORCO) 7.5-325 mg per tablet Take 1 Tab by mouth two (2) times a day.     Yes Historical Provider   meloxicam (MOBIC) 15 mg tablet Take 15 mg by mouth daily.     Yes Historical Provider   traMADol (ULTRAM) 50 mg tablet Take 50 mg by mouth daily.     Yes Historical Provider   lisinopril-hydroCHLOROthiazide (PRINZIDE, ZESTORETIC) 10-12.5 mg per tablet Take 1 Tab by mouth daily.     Yes Historical Provider   omeprazole (PRILOSEC) 40 mg capsule Take 40 mg by mouth daily as needed.     Yes Historical Provider   sertraline (ZOLOFT) 100 mg tablet Take 50 mg by mouth two (2) times a day.     Yes Historical Provider simvastatin (ZOCOR) 20 mg tablet Take 20 mg by mouth nightly.     Yes Historical Provider   HYDROcodone-acetaminophen (NORCO) 7.5-325 mg per tablet Take 1 Tab by mouth every six (6) hours as needed for Pain.       Historical Provider             Current Facility-Administered Medications   Medication Dose Route Frequency    0.9% sodium chloride infusion  50 mL/hr IntraVENous CONTINUOUS    HYDROcodone-acetaminophen (NORCO) 7.5-325 mg per tablet 1 Tab  1 Tab Oral Q6H PRN    omeprazole (PRILOSEC) capsule 40 mg  40 mg Oral PRN    sertraline (ZOLOFT) tablet 50 mg  50 mg Oral BID    acetaminophen (TYLENOL) tablet 500 mg  500 mg Oral Q6H PRN    simvastatin (ZOCOR) tablet 20 mg  20 mg Oral QHS    cefTRIAXone (ROCEPHIN) 1 g in 0.9% sodium chloride (MBP/ADV) 50 mL  1 g IntraVENous Q24H    oxyCODONE IR (ROXICODONE) tablet 5 mg  5 mg Oral Q6H PRN      REVIEW OF SYSTEMS:  BOLD = POSITIVE; negative = normal text  General:  fever, chills, sweats, weakness, weight loss/gain  Eyes: blurred vision, eye pain, loss of vision, diplopia  Ear Nose and Throat: rhinorrhea, pharyngitis, otalgia, tinnitus, speech or swallowing difficulties  Respiratory: cough, sputum production, SOB, wheezing, PULIDO, pleuritic pain  Cardiology:   chest pain, palpitations, orthopnea, PND, edema, syncope   Gastrointestinal: abdominal pain, N/V, dysphagia, change in bowel habits, bleeding  Genitourinary: frequency, urgency, dysuria, hematuria, incontinence  Muskuloskeletal : arthralgia, myalgia  Hematology:  easy bruising, bleeding, lymphadenopathy  Dermatological: rash, ulceration, mole change, new lesion  Endocrine: hot flashes or polydipsia  Neurological:  headache, dizziness, confusion, focal weakness, paresthesia, memory loss, gait disturbance  Psychological:  anxiety, depression, agitation        Objective:   VITALS:         Visit Vitals    /76 (BP 1 Location: Left arm, BP Patient Position: At rest)    Pulse 72    Temp 98.1 °F (36.7 °C)    Resp 16    SpO2 95%      Temp (24hrs), Av.1 °F (36.7 °C), Min:98 °F (36.7 °C), Max:98.1 °F (36.7 °C)     PHYSICAL EXAM:     General:                    Alert, cooperative, no distress, appears stated age. HEENT:                     Atraumatic, anicteric sclerae, pink conjunctivae                                              No oral ulcers, mucosa dry, throat clear. Hearing intact. Neck:                                   Supple, symmetrical,  thyroid: non tender  Lungs:                       Clear to auscultation bilaterally. No Wheezing or Rhonchi. No rales. Chest wall:                No tenderness  No Accessory muscle use. Heart:                                  Regular  rhythm,  No  murmur   No gallop. No edema  Abdomen:                  Soft, non-tender. Not distended. Bowel sounds normal. No masses  Extremities:               No cyanosis. No clubbing. Right arm in sling  Skin:                                    Not pale Not Jaundiced  No rashes   Psych:                                 Good insight. Not depressed. Not anxious or agitated. Neurologic:                EOMs intact. No facial asymmetry. No aphasia or slurred speech.   Alert and oriented X 3.      ________________________________________________________________________  Care Plan discussed with:      Comments   Patient y     Family        RN       Care Manager                      Consultant:        ________________________________________________________________________  TOTAL TIME:  25 minutes  ________________________________________________________________________  Howard Bravo MD        Procedures: see electronic medical records for all procedures/Xrays and details which were not copied into this note but were reviewed prior to creation of Plan.     LAB DATA REVIEWED:     Recent Results

## 2018-07-13 NOTE — PERIOP NOTES
Permission received to review discharge instructions and discuss private health information with  Bella Gray

## 2018-07-13 NOTE — PROGRESS NOTES
Hospitalist Progress Note    NAME: Emmanuelle Orozco   :  1945   MRN:  [id number]     Medicine Consult    Interim Hospital Summary: 74 y.o. female whom presented on 2018 with      Assessment / Plan:  S/P right reverse shoulder arthoplasty revision (2018) per Dr. Hendrix Points  - post op pain management/activity order per ortho    UTI, POA  - urine cx grew gram (-) rods; continue with rocephin. Recommend to complete 3 days of ABX  - asymptomatic UTI, but initiated the therapy since she was having right shoulder surgery    EZIO  - creat 1.5; currently on IVF per post op order  - will check BMP tomorrow; no previous record to compare her baseline at this point      DM type 2  - A1c 5.8 .  Now off medication; metformin discontinued last month by pcp.  - continue with diabetic diet      HTN  - pt was taking Lisinopirl/hctx 10/12.5 at home  -  blood pressure 140-170's/70-90's. Start Lisinopril 10mg with parameter    Hyperlipidemia  - continue zocor      Arthritis  Chronic pain  - on pain contract with pcp for hydrocodone and was in trouble last month when also took oxycodone prescribed after right shoulder arthroplasty. - continue with bowel regimen    Code status: Full  Prophylaxis: SCD's  Recommended Disposition: Home w/Family     Subjective:     Chief Complaint / Reason for Physician Visit  \"I feel ok\". Discussed with RN events overnight. Review of Systems:  Symptom Y/N Comments  Symptom Y/N Comments   Fever/Chills n   Chest Pain n    Poor Appetite    Edema     Cough    Abdominal Pain     Sputum    Joint Pain y    SOB/PULIDO n   Pruritis/Rash     Nausea/vomit n   Tolerating PT/OT     Diarrhea    Tolerating Diet     Constipation    Other       Could NOT obtain due to:      Objective:     VITALS:   Last 24hrs VS reviewed since prior progress note.  Most recent are:  Patient Vitals for the past 24 hrs:   Temp Pulse Resp BP SpO2   18 1636 - 70 20 133/82 100 %   18 1548 97.9 °F (36.6 °C) 73 22 170/89 98 %   07/13/18 1515 - 71 27 125/73 100 %   07/13/18 1500 - 76 26 131/83 100 %   07/13/18 1445 98.1 °F (36.7 °C) 73 15 116/89 100 %   07/13/18 1430 - 70 14 143/76 99 %   07/13/18 1420 - 75 18 (!) 110/98 99 %   07/13/18 1415 - 75 21 112/76 99 %   07/13/18 1411 98.6 °F (37 °C) 70 19 152/80 99 %   07/13/18 1406 98.6 °F (37 °C) 73 15 157/85 100 %   07/13/18 1249 - 72 29 141/78 100 %   07/13/18 1244 - 90 26 (!) 132/91 100 %   07/13/18 1237 - 87 17 (!) 137/91 98 %   07/13/18 1224 - 85 13 (!) 139/94 100 %   07/13/18 1204 98.3 °F (36.8 °C) (!) 103 18 (!) 143/100 97 %   07/13/18 0904 98 °F (36.7 °C) 67 16 127/80 94 %   07/12/18 2359 97.6 °F (36.4 °C) 88 16 135/77 92 %   07/12/18 1959 98 °F (36.7 °C) 96 16 124/76 95 %       Intake/Output Summary (Last 24 hours) at 07/13/18 1644  Last data filed at 07/13/18 1520   Gross per 24 hour   Intake             1795 ml   Output              400 ml   Net             1395 ml        PHYSICAL EXAM:  General: Ill appearing. Alert, cooperative, no acute distress    EENT:  EOMI. Anicteric sclerae. MMM  Resp:  CTA bilaterally, no wheezing or rales. No accessory muscle use  CV:  Regular  rhythm,  No edema  GI:  Soft, Non distended, Non tender.  +Bowel sounds  Neurologic:  Alert and oriented X 3, normal speech, right shoulder sling in place  Psych:   Good insight. Not anxious nor agitated  Skin:  No rashes. No jaundice    Reviewed most current lab test results and cultures  YES  Reviewed most current radiology test results   YES  Review and summation of old records today    NO  Reviewed patient's current orders and MAR    YES  PMH/SH reviewed - no change compared to H&P  ________________________________________________________________________  Care Plan discussed with:    Comments   Patient y    [facility]                        Multidiciplinary team rounds were held today with , nursing, pharmacist and clinical coordinator.   Patient's plan of care was discussed; medications were reviewed and discharge planning was addressed. ________________________________________________________________________  Total NON critical care TIME:  25   Minutes    Total CRITICAL CARE TIME Spent:   Minutes non procedure based      Comments   >50% of visit spent in counseling and coordination of care     ________________________________________________________________________  Bacilio Butcher NP     Procedures: see electronic medical records for all procedures/Xrays and details which were not copied into this note but were reviewed prior to creation of Plan. LABS:  I reviewed today's most current labs and imaging studies.   Pertinent labs include:  Recent Labs      07/12/18   1104   WBC  7.3   HGB  11.1*   HCT  35.7   PLT  234     Recent Labs      07/12/18   1104   NA  142   K  4.1   CL  105   CO2  27   GLU  153*   BUN  29*   CREA  1.54*   CA  9.4   ALB  3.7   TBILI  0.3   SGOT  16   ALT  15   INR  1.0       Signed: )Venus Pena NP

## 2018-07-14 LAB
ANION GAP SERPL CALC-SCNC: 7 MMOL/L (ref 5–15)
BACTERIA SPEC CULT: ABNORMAL
BUN SERPL-MCNC: 20 MG/DL (ref 6–20)
BUN/CREAT SERPL: 15 (ref 12–20)
CALCIUM SERPL-MCNC: 8.8 MG/DL (ref 8.5–10.1)
CC UR VC: ABNORMAL
CHLORIDE SERPL-SCNC: 109 MMOL/L (ref 97–108)
CO2 SERPL-SCNC: 25 MMOL/L (ref 21–32)
CREAT SERPL-MCNC: 1.35 MG/DL (ref 0.55–1.02)
GLUCOSE BLD STRIP.AUTO-MCNC: 130 MG/DL (ref 65–100)
GLUCOSE BLD STRIP.AUTO-MCNC: 130 MG/DL (ref 65–100)
GLUCOSE BLD STRIP.AUTO-MCNC: 158 MG/DL (ref 65–100)
GLUCOSE SERPL-MCNC: 106 MG/DL (ref 65–100)
POTASSIUM SERPL-SCNC: 4.3 MMOL/L (ref 3.5–5.1)
SERVICE CMNT-IMP: ABNORMAL
SODIUM SERPL-SCNC: 141 MMOL/L (ref 136–145)

## 2018-07-14 PROCEDURE — 82962 GLUCOSE BLOOD TEST: CPT

## 2018-07-14 PROCEDURE — 97165 OT EVAL LOW COMPLEX 30 MIN: CPT | Performed by: OCCUPATIONAL THERAPIST

## 2018-07-14 PROCEDURE — G8987 SELF CARE CURRENT STATUS: HCPCS | Performed by: OCCUPATIONAL THERAPIST

## 2018-07-14 PROCEDURE — G8988 SELF CARE GOAL STATUS: HCPCS | Performed by: OCCUPATIONAL THERAPIST

## 2018-07-14 PROCEDURE — 36415 COLL VENOUS BLD VENIPUNCTURE: CPT | Performed by: NURSE PRACTITIONER

## 2018-07-14 PROCEDURE — 74011250637 HC RX REV CODE- 250/637: Performed by: PHYSICIAN ASSISTANT

## 2018-07-14 PROCEDURE — 74011250636 HC RX REV CODE- 250/636: Performed by: PHYSICIAN ASSISTANT

## 2018-07-14 PROCEDURE — 97110 THERAPEUTIC EXERCISES: CPT | Performed by: OCCUPATIONAL THERAPIST

## 2018-07-14 PROCEDURE — 74011000258 HC RX REV CODE- 258: Performed by: PHYSICIAN ASSISTANT

## 2018-07-14 PROCEDURE — 97535 SELF CARE MNGMENT TRAINING: CPT | Performed by: OCCUPATIONAL THERAPIST

## 2018-07-14 PROCEDURE — 65270000029 HC RM PRIVATE

## 2018-07-14 PROCEDURE — 77010033678 HC OXYGEN DAILY

## 2018-07-14 PROCEDURE — 80048 BASIC METABOLIC PNL TOTAL CA: CPT | Performed by: NURSE PRACTITIONER

## 2018-07-14 RX ORDER — DIPHENHYDRAMINE HCL 25 MG
25 CAPSULE ORAL
Status: DISCONTINUED | OUTPATIENT
Start: 2018-07-14 | End: 2018-07-15 | Stop reason: HOSPADM

## 2018-07-14 RX ADMIN — DIPHENHYDRAMINE HYDROCHLORIDE 25 MG: 25 CAPSULE ORAL at 20:10

## 2018-07-14 RX ADMIN — FAMOTIDINE 20 MG: 20 TABLET ORAL at 08:04

## 2018-07-14 RX ADMIN — SODIUM CHLORIDE 100 ML/HR: 900 INJECTION, SOLUTION INTRAVENOUS at 00:11

## 2018-07-14 RX ADMIN — OXYCODONE HYDROCHLORIDE 5 MG: 5 TABLET ORAL at 00:11

## 2018-07-14 RX ADMIN — ACETAMINOPHEN 500 MG: 500 TABLET, FILM COATED ORAL at 05:55

## 2018-07-14 RX ADMIN — Medication 2 G: at 04:35

## 2018-07-14 RX ADMIN — ACETAMINOPHEN 500 MG: 500 TABLET, FILM COATED ORAL at 11:41

## 2018-07-14 RX ADMIN — DOCUSATE SODIUM AND SENNOSIDES 1 TABLET: 8.6; 5 TABLET, FILM COATED ORAL at 08:04

## 2018-07-14 RX ADMIN — Medication 10 ML: at 22:00

## 2018-07-14 RX ADMIN — OXYCODONE HYDROCHLORIDE 10 MG: 5 TABLET ORAL at 18:50

## 2018-07-14 RX ADMIN — Medication 10 ML: at 14:26

## 2018-07-14 RX ADMIN — Medication 10 ML: at 06:22

## 2018-07-14 RX ADMIN — DOCUSATE SODIUM AND SENNOSIDES 1 TABLET: 8.6; 5 TABLET, FILM COATED ORAL at 17:16

## 2018-07-14 RX ADMIN — ACETAMINOPHEN 500 MG: 500 TABLET, FILM COATED ORAL at 17:16

## 2018-07-14 RX ADMIN — SERTRALINE HYDROCHLORIDE 50 MG: 50 TABLET ORAL at 17:16

## 2018-07-14 RX ADMIN — SERTRALINE HYDROCHLORIDE 50 MG: 50 TABLET ORAL at 08:04

## 2018-07-14 RX ADMIN — OXYCODONE HYDROCHLORIDE 10 MG: 5 TABLET ORAL at 04:34

## 2018-07-14 RX ADMIN — HYDROMORPHONE HYDROCHLORIDE 0.5 MG: 2 INJECTION, SOLUTION INTRAMUSCULAR; INTRAVENOUS; SUBCUTANEOUS at 06:17

## 2018-07-14 RX ADMIN — CEFTRIAXONE 1 G: 1 INJECTION, POWDER, FOR SOLUTION INTRAMUSCULAR; INTRAVENOUS at 17:16

## 2018-07-14 RX ADMIN — OXYCODONE HYDROCHLORIDE 10 MG: 5 TABLET ORAL at 13:15

## 2018-07-14 RX ADMIN — POLYETHYLENE GLYCOL 3350 17 G: 17 POWDER, FOR SOLUTION ORAL at 08:04

## 2018-07-14 NOTE — PROGRESS NOTES
Hospitalist Progress Note    NAME: Tio Torres   :  1945   MRN:  [id number]     Medicine Consult    Interim Hospital Summary: 74 y.o. female whom presented on 2018 with      Assessment / Plan:  S/P right reverse shoulder arthoplasty revision (2018) per Dr. Eyal Strauss  - post op pain management/activity order per ortho    E coli UTI sensitive to Ceftriaxone  . Recommend to complete 3 days of ABX  - asymptomatic UTI, but initiated the therapy since she was having right shoulder surgery    EZIO  - creat 1.55; currently on IVF per post op order Improved at 1.35  - will check BMP tomorrow; no previous record to compare her baseline at this point      DM type 2 pretty well controlled , Diet alone  - A1c 5.8 .  Now off medication; metformin discontinued last month by pcp.  - continue with diabetic diet      HTN  - pt was taking Lisinopirl/hctx 10.5 at home  -  blood pressure 140-170's/70-90's. Start Lisinopril 10mg with parameter    Hyperlipidemia  - continue zocor      Arthritis  Chronic pain  - on pain contract with pcp for hydrocodone and was in trouble last month when also took oxycodone prescribed after right shoulder arthroplasty. - continue with bowel regimen    Code status: Full  Prophylaxis: SCD's  Recommended Disposition: Home w/Family     Subjective:     Chief Complaint / Reason for Physician Visit  Patient deep sleep  Briefly woke  \"I feel ok\". Discussed with RN events overnight. Review of Systems:  Symptom Y/N Comments  Symptom Y/N Comments   Fever/Chills n   Chest Pain n    Poor Appetite    Edema     Cough    Abdominal Pain     Sputum    Joint Pain y    SOB/PULIDO n   Pruritis/Rash     Nausea/vomit n   Tolerating PT/OT     Diarrhea    Tolerating Diet     Constipation    Other       Could NOT obtain due to:      Objective:     VITALS:   Last 24hrs VS reviewed since prior progress note.  Most recent are:  Patient Vitals for the past 24 hrs:   Temp Pulse Resp BP SpO2   18 1159 99 °F (37.2 °C) 90 18 110/74 93 %   07/14/18 1047 - - - 106/71 98 %   07/14/18 0800 98 °F (36.7 °C) 74 18 102/74 98 %   07/14/18 0359 98.7 °F (37.1 °C) 70 18 131/81 100 %   07/13/18 2316 99.3 °F (37.4 °C) 75 16 130/73 97 %   07/13/18 1958 98.7 °F (37.1 °C) 80 20 133/85 100 %   07/13/18 1830 - 76 - 150/80 99 %   07/13/18 1800 - 77 - 146/83 98 %   07/13/18 1732 - 76 20 152/74 100 %   07/13/18 1636 - 70 20 133/82 100 %     No intake or output data in the 24 hours ending 07/14/18 1618     PHYSICAL EXAM:  General: NAD appearing. Alert, cooperative, no acute distress    EENT:  EOMI. Anicteric sclerae. MMM  Resp:  CTA bilaterally, no wheezing or rales. No accessory muscle use  CV:  Regular  rhythm,  No edema  GI:  Soft, Non distended, Non tender.  +Bowel sounds  Neurologic:  Alert and oriented X 3, normal speech, right shoulder sling in place  Psych:   Good insight. Not anxious nor agitated  Skin:  No rashes. No jaundice    Reviewed most current lab test results and cultures  YES  Reviewed most current radiology test results   YES  Review and summation of old records today    NO  Reviewed patient's current orders and MAR    YES  PMH/ reviewed - no change compared to H&P  ________________________________________________________________________  Care Plan discussed with:    Comments   Patient y Deep sleep    Family      RN     Care Manager     Consultant                        Multidiciplinary team rounds were held today with , nursing, pharmacist and clinical coordinator. Patient's plan of care was discussed; medications were reviewed and discharge planning was addressed.      ________________________________________________________________________  Total NON critical care TIME: 12  Minutes    Total CRITICAL CARE TIME Spent:   Minutes non procedure based      Comments   >50% of visit spent in counseling and coordination of care ________________________________________________________________________  Merlene Epstein MD     Procedures: see electronic medical records for all procedures/Xrays and details which were not copied into this note but were reviewed prior to creation of Plan. LABS:  I reviewed today's most current labs and imaging studies.   Pertinent labs include:  Recent Labs      07/12/18   1104   WBC  7.3   HGB  11.1*   HCT  35.7   PLT  234     Recent Labs      07/14/18   0436  07/12/18   1104   NA  141  142   K  4.3  4.1   CL  109*  105   CO2  25  27   GLU  106*  153*   BUN  20  29*   CREA  1.35*  1.54*   CA  8.8  9.4   ALB   --   3.7   TBILI   --   0.3   SGOT   --   16   ALT   --   15   INR   --   1.0       Signed: )Merlene Epstein MD

## 2018-07-14 NOTE — ROUTINE PROCESS
Bedside shift change report given to Katelyn Pelaez RN (oncoming nurse) by Katheryn Ramirez (offgoing nurse). Report included the following information SBAR, Kardex, Procedure Summary, Intake/Output, MAR, Accordion, Recent Results and Med Rec Status.

## 2018-07-14 NOTE — PROGRESS NOTES
Spoke with nursing, pt and her family. Attempted eval but need to have confirmation from doctor on ROM and or restrictions for shoulder due to revision. Please confirm precautions and exercises for revision surgery so that OT services can see pt today. Nurse and family are aware and note left on white board for doctor.

## 2018-07-14 NOTE — PROGRESS NOTES
[facility] - Progress Note  POD#1 s/p right revision of right reverse TSA after right shoulder dislocation    Pt resting in bed. No complaints. Pain moderate. Managing with oxycodone  VSS Afebrile. Incision and dressing c.d.i  Calves soft and supple; No pain with passive stretch  Sensation and motor intact  SCD/Foot pumps for mechanical DVT proph while in bed     PLAN:  1) PT/OT today, continue with normal TSA exercises, dangles, forward elevation, no external rotation  2) Pain control continue with oxycodone, plans to resume normal pain management dosing at discharge  3) Receiving abx for UTI, rocephi  3) Plan d/c pending OT/PT eval, likely home, will get one more dose of rocephin tomorrow.      SEAN Hernandez

## 2018-07-14 NOTE — PROGRESS NOTES
Problem: Self Care Deficits Care Plan (Adult)  Goal: *Acute Goals and Plan of Care (Insert Text)  Occupational Therapy Goals:  Initiated 7/14/2018  1. Patient will perform upper body dressing excluding bra with supervision/set-up within 7 days. 2. Patient will perform toileting with supervision/set-up within 7 days. 3. Patient will perform lower body dressing with supervision/set-up within 7 days. 4. Patient will be independent with with home exercise program within 7 days. 5. Patient will transfer from toilet with independence within 7 days. Occupational Therapy EVALUATION  Patient: Amy Ames (69 y.o. female)  Date: 7/14/2018  Primary Diagnosis: Right reverse shoulder dislocation  Shoulder dislocation, right, initial encounter  Shoulder dislocation, right, initial encounter  Shoulder dislocation, right, initial encounter  DISLOCATED RIGHT TOTAL SHOULDER  Procedure(s) (LRB):  RIGHT REVERSE SHOULDER ARTHROPLASTY REVISION (Right) 1 Day Post-Op   Precautions:    (Codman dangles. Passive and some gentle active assisted forw)    ASSESSMENT :  Confirmed with ortho PA that pt is allowed to perform protocol TSR exercises despite revision surgery. Pts  was at bedside and both pt and her  were educated on all precautions, exercises, home safety and adaptive strategies for ADLs. She is familiar with codmans and supine passive flexion using non op arm to assist but needed verbal and tactile cues not to move her shoulder actively. She was fatigued this session and noted that she has a UTI that is being treated. Min assist for supine to sit opposite that of her surgery.  has been assisting with dressing and ADLs at home and pt only had one session with OP PT. Min hand held assist for mobility to bathroom due to decreased balance and pt reaching for objects. This improved to CGA over time. Performed toileting managing underwear up and down with CGA. Stood at sink to groom with CGA. Returned to bedside chair at end of session. Stable vitals and pt was on room air (nurse aware). Pt is performing UB ADLs at a. Patient will benefit from skilled intervention to address the above impairments. Patients rehabilitation potential is considered to be Good  Factors which may influence rehabilitation potential include:   []             None noted  []             Mental ability/status  []             Medical condition  []             Home/family situation and support systems  []             Safety awareness  []             Pain tolerance/management  []             Other:      PLAN :  Recommendations and Planned Interventions:  [x]               Self Care Training                  [x]        Therapeutic Activities  [x]               Functional Mobility Training    []        Cognitive Retraining  [x]               Therapeutic Exercises           []        Endurance Activities  [x]               Balance Training                   []        Neuromuscular Re-Education  []               Visual/Perceptual Training     [x]   Home Safety Training  [x]               Patient Education                 [x]        Family Training/Education  []               Other (comment):    Frequency/Duration: Patient will be followed by occupational therapy 4 times a week to address goals. Discharge Recommendations: Home Health  Further Equipment Recommendations for Discharge: none     SUBJECTIVE:   Patient stated I don't feel good. I will try.     OBJECTIVE DATA SUMMARY:   HISTORY:   Past Medical History:   Diagnosis Date    Arthritis     Blind right eye 2017    Diabetes ([address]     Gall stones     h/o    GERD (gastroesophageal reflux disease)     High cholesterol     Hypertension     Insomnia     Migraines     Nausea & vomiting     Psychiatric disorder     depression     Past Surgical History:   Procedure Laterality Date    HX APPENDECTOMY      HX CHOLECYSTECTOMY      HX HERNIA REPAIR  08/2017, 3/21/18 ventral hernia with mesh    HX HYSTERECTOMY      HX KNEE REPLACEMENT      bilateral    HX OOPHORECTOMY      HX ORTHOPAEDIC      rt ankle    HX ORTHOPAEDIC      joints scrapped bilat. hands due to arthritis    HX SHOULDER REPLACEMENT Right 06/01/2018    RIGHT REVERSE SHOULDER ARTHROPLASTY, AXILLARY NERVE NEUROPLASTY, BICEPS TENODESIS    HX UROLOGICAL      rectal and bladder tack       Prior Level of Function/Environment/Context:  was assisting with bra; socks and occasional pulling up of pants; SBA for donning shirt; mobilized without assist devices; had only one session with OP PT:   Expanded or extensive additional review of patient history:     Home Situation  Home Environment: Private residence  One/Two Story Residence: One story  Support Systems: Spouse/Significant Other/Partner  Current DME Used/Available at Home: None  Tub or Shower Type: Shower    Hand dominance: Right    EXAMINATION OF PERFORMANCE DEFICITS:  Cognitive/Behavioral Status:  Neurologic State: Alert  Orientation Level: Oriented X4  Cognition: Appropriate for age attention/concentration; Appropriate decision making; Follows commands  Perception: Appears intact  Perseveration: No perseveration noted  Safety/Judgement: Decreased awareness of environment; Fall prevention (verbal and tactile cues to adhere to precautions)    Hearing: Auditory  Auditory Impairment: None    Vision/Perceptual:                           Acuity:  (blind in right eye; left eye functional)    Corrective Lenses: Glasses    Range of Motion:    AROM:  (operative shld not tested; all other [facility])                         Strength:    Strength:  (operative extremity not tested; all other wFL)                Coordination:     Fine Motor Skills-Upper: Left Intact; Right Intact    Gross Motor Skills-Upper:  (operative extremity not tested; all other WFL)    Tone & Sensation:    Tone: Normal  Sensation: Intact                      Balance:  Sitting: Intact  Standing: Impaired  Standing - Static: Good  Standing - Dynamic : Fair    Functional Mobility and Transfers for ADLs:  Bed Mobility:  Rolling: Supervision (educated to get up on opposite side from shld surgery)  Supine to Sit: Minimum assistance (upper trunk)  Scooting: Supervision    Transfers:  Sit to Stand: Contact guard assistance  Stand to Sit: Contact guard assistance  Bed to Chair: Contact guard assistance;Minimum assistance  Toilet Transfer : Contact guard assistance    ADL Assessment:  Feeding: Supervision    Oral Facial Hygiene/Grooming: Supervision    Bathing: Moderate assistance (educated on one hand technique)    Upper Body Dressing: Moderate assistance    Lower Body Dressing: Moderate assistance    Toileting: Minimum assistance                ADL Intervention and task modifications:  Educated pt and all prescribe precautions:  [address] Passive and some gentle active assisted forward elevation only. No external rotation. Washing UB:  Patient instructed and indicated understanding propping surgical arm on surface, can back away from arm in order to access axilla to wash, dry, and deodorize or lean forward at waist like pendulum exercise to wash and dry with narrow part of hand to wash to prevent abduction. Educated pt to sit to don LB clothing and to not stand on one foot     Patient instructed and indicated understanding dress operative UE first/undress last. Patient instructed and indicated understanding of compensatory strategies to don sling donned and doffed sling x1and pt needed moderate assist to perform. Verbal cues for pt not to move left UE at the shoulder.                Therapeutic Exercise: Educated pt on safe PROM left shoulder and educated to perform 2x a day 10-15 reps     EXERCISE   Sets   Reps   Active Active Assist   Passive   Comments   Shoulder Flexion 1 10 []               []               [x]                SROM using non op extremity to perform exercise with op extremity; pillow behind shoulder to prevent extension   Pendulums: clockwise, counter clockwise, lateral standing 1 10 []               []               [x]               Max tactile and manual cues for pt not to move shoulder actively      []               []               []                   Elbow flexion/extension 2 10 [x]               []               []                  Wrist flexion/extension 2 10 [x]               []               []                  Finger flexion/extension 2 10 [x]               []               []                                            Cognitive Retraining  Safety/Judgement: Decreased awareness of environment; Fall prevention (verbal and tactile cues to adhere to precautions)      Functional Measure:  Barthel Index:    Bathin  Bladder: 10  Bowels: 10  Groomin  Dressin  Feeding: 10  Mobility: 10  Stairs: 0  Toilet Use: 5  Transfer (Bed to Chair and Back): 10  Total: 65       Barthel and G-code impairment scale:  Percentage of impairment CH  0% CI  1-19% CJ  20-39% CK  40-59% CL  60-79% CM  80-99% CN  100%   Barthel Score 0-100 100 99-80 79-60 59-40 20-39 1-19   0   Barthel Score 0-20 20 17-19 13-16 9-12 5-8 1-4 0      The Barthel ADL Index: Guidelines  1. The index should be used as a record of what a patient does, not as a record of what a patient could do. 2. The main aim is to establish degree of independence from any help, physical or verbal, however minor and for whatever reason. 3. The need for supervision renders the patient not independent. 4. A patient's performance should be established using the best available evidence. Asking the patient, friends/relatives and nurses are the usual sources, but direct observation and common sense are also important. However direct testing is not needed. 5. Usually the patient's performance over the preceding 24-48 hours is important, but occasionally longer periods will be relevant.   6. Middle categories imply that the patient supplies over 50 per cent of the effort. 7. Use of aids to be independent is allowed. Irene Saver., Barthel, D.W. (7614). Functional evaluation: the Barthel Index. [address] [facility] (14)2. LAURA Rich, Tere Arana., Jones, [address] [facility] (1999). Measuring the change indisability after inpatient rehabilitation; comparison of the responsiveness of the Barthel Index and Functional Luce Measure. Journal of Neurology, Neurosurgery, and Psychiatry, 66(4), 486-290. JACLYN Noonan, BOUBACAR Stout, & Rosita Chinchilla M.A. (2004.) Assessment of post-stroke quality of life in cost-effectiveness studies: The usefulness of the Barthel Index and the EuroQoL-5D. Quality of Life Research, 13, 397-82         G codes: In compliance with CMSs Claims Based Outcome Reporting, the following G-code set was chosen for this patient based on their primary functional limitation being treated: The outcome measure chosen to determine the severity of the functional limitation was the barthel with a score of 65/100 which was correlated with the impairment scale. ? Self Care:     - CURRENT STATUS: CJ - 20%-39% impaired, limited or restricted    - GOAL STATUS: CI - 1%-19% impaired, limited or restricted    - D/C STATUS:  ---------------To be determined---------------     Occupational Therapy Evaluation Charge Determination   History Examination Decision-Making   MEDIUM Complexity : Expanded review of history including physical, cognitive and psychosocial  history  LOW Complexity : 1-3 performance deficits relating to physical, cognitive , or psychosocial skils that result in activity limitations and / or participation restrictions  MEDIUM Complexity : Patient may present with comorbidities that affect occupational performnce.  Miniml to moderate modification of tasks or assistance (eg, physical or verbal ) with assesment(s) is necessary to enable patient to complete evaluation       Based on the above components, the patient evaluation is determined to be of the following complexity level: LOW   Pain:  Pain Scale 1: Numeric (0 - 10)  Pain Intensity 1: 8  Pain Location 1: Shoulder  Pain Orientation 1: Right  Pain Description 1: Sore  Pain Intervention(s) 1: Medication (see MAR)  Activity Tolerance:     Please refer to the flowsheet for vital signs taken during this treatment. After treatment:   [x] Patient left in no apparent distress sitting up in chair; sling on with pillow supporting shoulder  [] Patient left in no apparent distress in bed  [x] Call bell left within reach  [x] Nursing notified  [x] Caregiver present  [] Bed alarm activated    COMMUNICATION/EDUCATION:   The patients plan of care was discussed with: Physical Therapist, Registered Nurse and patient. [x] Home safety education was provided and the patient/caregiver indicated understanding. [x] Patient/family have participated as able in goal setting and plan of care. [x] Patient/family agree to work toward stated goals and plan of care. [] Patient understands intent and goals of therapy, but is neutral about his/her participation. [] Patient is unable to participate in goal setting and plan of care. This patients plan of care is appropriate for delegation to [facility].     Thank you for this referral.  Rona Hopper OTR/L  Time Calculation: 26 mins

## 2018-07-15 VITALS
OXYGEN SATURATION: 94 % | HEIGHT: 61 IN | HEART RATE: 78 BPM | TEMPERATURE: 98.3 F | RESPIRATION RATE: 16 BRPM | DIASTOLIC BLOOD PRESSURE: 76 MMHG | WEIGHT: 155 LBS | SYSTOLIC BLOOD PRESSURE: 142 MMHG | BODY MASS INDEX: 29.27 KG/M2

## 2018-07-15 LAB
GLUCOSE BLD STRIP.AUTO-MCNC: 104 MG/DL (ref 65–100)
SERVICE CMNT-IMP: ABNORMAL

## 2018-07-15 PROCEDURE — 82962 GLUCOSE BLOOD TEST: CPT

## 2018-07-15 PROCEDURE — 74011250637 HC RX REV CODE- 250/637: Performed by: NURSE PRACTITIONER

## 2018-07-15 PROCEDURE — 77010033678 HC OXYGEN DAILY

## 2018-07-15 PROCEDURE — 74011250637 HC RX REV CODE- 250/637: Performed by: PHYSICIAN ASSISTANT

## 2018-07-15 RX ORDER — AMOXICILLIN 250 MG
1 CAPSULE ORAL 2 TIMES DAILY
Qty: 120 TAB | Refills: 0 | Status: SHIPPED | OUTPATIENT
Start: 2018-07-15

## 2018-07-15 RX ORDER — CIPROFLOXACIN 250 MG/1
500 TABLET, FILM COATED ORAL EVERY 12 HOURS
Qty: 16 TAB | Refills: 0 | Status: SHIPPED | OUTPATIENT
Start: 2018-07-15 | End: 2018-07-19

## 2018-07-15 RX ADMIN — SERTRALINE HYDROCHLORIDE 50 MG: 50 TABLET ORAL at 08:05

## 2018-07-15 RX ADMIN — OXYCODONE HYDROCHLORIDE 5 MG: 5 TABLET ORAL at 10:22

## 2018-07-15 RX ADMIN — DOCUSATE SODIUM AND SENNOSIDES 1 TABLET: 8.6; 5 TABLET, FILM COATED ORAL at 08:05

## 2018-07-15 RX ADMIN — OXYCODONE HYDROCHLORIDE 5 MG: 5 TABLET ORAL at 00:26

## 2018-07-15 RX ADMIN — LISINOPRIL 10 MG: 5 TABLET ORAL at 08:05

## 2018-07-15 RX ADMIN — FAMOTIDINE 20 MG: 20 TABLET ORAL at 08:05

## 2018-07-15 RX ADMIN — Medication 10 ML: at 06:00

## 2018-07-15 RX ADMIN — ACETAMINOPHEN 500 MG: 500 TABLET, FILM COATED ORAL at 00:26

## 2018-07-15 NOTE — DISCHARGE INSTRUCTIONS
Arnaud Rodriguez M.D.           Graciela Blood  Surgery: Total Shoulder Replacement  Surgeon: Alfonso Bland MD  Surgery Date:  7/13/2018      Going Home from the [address] [place] [address], [address] [place] [address] can let your arm hang loosely by your side, but avoid actively lifting the surgical arm. You may perform Codman dangles and shoulder blade pinching exercises as instructed. Do not put weight on the affected arm. Do not lean on it, and do not hold objects with that hand. In general for the first week keep arm in sling, rest, perform simple stretching exercises and ice your shoulder. Common Post-op Concerns      Hand swelling: Adjust sling, squeeze a stress ball, do biceps curls to help pump  the fluid out of your arm. Call the office if severe and we can see you to wrap  your hand/arm and send you for hand therapy. Bruising: Very common and will subside over 2-3 weeks. Nausea: If you have severe nausea call the office and a medicine can be called  in. Often times reducing pain medicine doses may provide relief. Fever: Somewhat common the first 3 days after surgery, take Tylenol    Sling  You should wear the sling most of the time until further instructions at your follow up appointment. You may need to adjust the front shoulder strap so that your hand is slightly above your elbow, this will prevent some of the hand swelling that is common after surgery. You may remove the sling when sitting down and allow your arm to rest in your lap. You may take the sling off to shower. To relieve pain:   Use ice/gel packs.  Put the ice pack directly over the wound, or anywhere you are hurting or swollen.  To control pain and swelling, keep ice on regularly, especially after physical activity.  The packs should stay cold for 3-4 hours. When it is not cold anymore, rotate with the packs in the freezer.        Rest for at least 20 minutes between activity or exercises.  You will be sent home with oxycodone for pain medicine.  To keep track of your pain medications, write down what you take and when you take it.  The last dose of pain medication you got in the hospital was:       Medication    Dose    Date & Time             Choose your medications based on the pain scale below:     To keep your pain under control, take Tylenol every 6 hours for 14 days - even if you feel like you dont need it.  For mild to moderate pain (1-6 on pain scale), take one pain pill every 3-4 hours as needed.  For severe pain (7-10 on pain scale), take two pain pills every 3-4 hours as needed.  To prevent nausea, take your pain medications with food. Pain Scale                   As your pain lessens:     Slowly start taking less pain medication. You may do this by waiting longer between doses or by taking smaller doses.  Stop using the pain medications as soon as you no longer need it, usually in 2-3 weeks.  Generally after shoulder surgery, ambulation or walking around and being active is the best prevention for blood clots.  If you are at risk for blood clots due to your inability to walk around or have had blood clots in the past you may take Aspirin 325 mg once a day for 2-3 weeks to lower the chance of developing a blood clot.  To prevent stomach upset or bleeding:   Do not take non-steroidal anti-inflammatory medications (Ibuprofen, Advil, Motrin, Naproxen, etc.)    Take Pepcid 20 mg twice a day, or a similar home medication, while you are taking a blood thinner. OPSITE (Honeycomb dressing)     Keep your clear, waterproof dressing in place for 5-7 days after your leave the hospital.     If you are still having drainage, you will need to change your dressing in 5-7 days. You will be given one extra dressing to use at home.      If there is no more drainage from the wound, you may leave it open to the air.    Your staples will be removed at your 1st postop appointment usually about 10 days after surgery. OPSITE DRESSING INSTRUCTIONS                       To increase and promote healing:   Stop Smoking (or at least cut back on       Smoking).  Eat a well-balanced diet (high in protein       and vitamin C).  If you have a poor appetite, drink Ensure, Glucerna, or         [employer] Instant Breakfast for the next 30 days.  If you are diabetic, you should control your blood         sugars to prevent infection and help your wound         to heal.                         To prevent constipation, stay active and drink plenty of fluid.  While using pain medications, you should also take stool softeners and laxatives, such as Pericolace and Miralax.  If you are having too many bowel       Movements, then you may need       to stop taking the laxatives.  You should have a bowel movement 3-4 days        after surgery and then at least every other day while       on pain medication.  Take short walks (in your home)         about every 1-2 hours during the day.  Try to increase how far you walk each day.  NO DRIVING until your surgeon tells you it is ok. Rehabilitation  Healing is the main focus during the early phase of your rehabilitation. This means protecting the shoulder and only performing elbow/hand/wrist exercises to prevent stiffness. · Weeks 0-3:  You should begin dangle exercises the day after surgery. Work at this for 5 repetitions about 5 times a day. You may also begin active-assisted forward flexion at this time by starting to lift your operative arm with your good arm there to help push it up. At this time avoid moving your arm out to the side or externally rotating.      · Weeks 3-6: You may begin active forward flexion at this time and continue using your other arm to help raise the operative arm. You may be set up to see a physical therapist to work on your range of motion at this time. · Weeks 6-12: Focus is on actively lifting your arm and achieving full range of motion and beginning with some light weight lifting. You may now begin externally rotating your arm and gently strengthening. At 2 months weight bearing on the arm is now permitted for the use of walkers, pushing up out of a chair, etc.        Please call your physician immediately if you have:     Constant bleeding from your wound.  Increasing redness or swelling around your wound (Some warmth, bruising and swelling is normal).  Change in wound drainage (increase in amount, color, or bad smell).  Change in mental status (unusual behavior   Temperature over 101.5 degrees Fahrenheit      Pain or redness in the calf (back of your lower leg - see picture)     Increased swelling of the thigh, ankle, calf, or foot.  Emergency: CALL 911 if you have:     Shortness of breath     Chest pain when you cough or taking a deep breath          A follow up appointment card will be given to you or your family member after the surgery. If you are not aware of your follow up time or lost the card, please call the office at [phone].  If you have questions or concerns during normal business hours, you may reach Dr. Gina Echols team at [phone]. If you have immediate concerns or questions you may call the office and reach Dr Gina cEhols or another [address] [place] [address] provider who is on call.  [phone]

## 2018-07-15 NOTE — PROGRESS NOTES
Orthopedic NP Progress Note  Post Op day: 2 Days Post-Op    July 15, 2018 10:16 AM     Olimpia Martinez    Attending Physician: Treatment Team: Attending Provider: Khushboo Winters MD; Utilization Review: Erickson Mccracken RN; Consulting Provider: Jose Mosley MD; Nurse Practitioner: Uriel Rich NP     Vital Signs:    Patient Vitals for the past 8 hrs:   BP Temp Pulse Resp SpO2   07/15/18 0800 142/76 98.3 °F (36.8 °C) 78 16 94 %   07/15/18 0731 142/86 98 °F (36.7 °C) 75 16 98 %     BMI (calculated): 29.3 (07/13/18 1204)    Intake/Output:  07/15 0701 - 07/15 1900  In: 240 [P.O.:240]  Out: -   07/13 1901 - 07/15 0700  In: 320 [P.O.:320]  Out: -     Pain Control:   Pain Assessment  Pain Scale 1: Numeric (0 - 10)  Pain Intensity 1: 3  Pain Location 1: Shoulder  Pain Orientation 1: Right  Pain Description 1: Aching  Pain Intervention(s) 1: Medication (see MAR)    LAB:    Recent Labs      07/12/18   1104   HCT  35.7   HGB  11.1*     Lab Results   Component Value Date/Time    Sodium 141 07/14/2018 04:36 AM    Potassium 4.3 07/14/2018 04:36 AM    Chloride 109 (H) 07/14/2018 04:36 AM    CO2 25 07/14/2018 04:36 AM    Glucose 106 (H) 07/14/2018 04:36 AM    BUN 20 07/14/2018 04:36 AM    Creatinine 1.35 (H) 07/14/2018 04:36 AM    Calcium 8.8 07/14/2018 04:36 AM       Subjective:  Olimpia Martinez is a 74 y.o. female s/p a  Procedure(s):  RIGHT REVERSE SHOULDER ARTHROPLASTY REVISION   Procedure(s):  RIGHT REVERSE SHOULDER ARTHROPLASTY REVISION. Tolerating diet. Pt is dressed and ready to go home, pain well managed      Objective: General: alert, cooperative, no distress. Neuro/Vascular: CNS Intact. Sensation stable. Brisk cap refill, 2+ pulses UE/LE  Musculoskeletal:  +ROM UE with sling to RUE/LE. Marco's sign negative in bilateral lower extremities. Calves soft, supple, non-tender upon palpation or with passive stretch. Skin: Incision - clean, dry and intact. No significant erythema or swelling.     Dressing: clean, dry, and intact    Gentile - n  Drain - n       PT/OT:   Gait:                      Assessment:    s/p Procedure(s):  RIGHT REVERSE SHOULDER ARTHROPLASTY REVISION    Active Problems:    Shoulder dislocation, right, initial encounter (7/12/2018)         Plan:     -  Continue PT/OT  -  Continue established methods of pain control  -  VTE Prophylaxes - TEDS &/or SCDs   -  UTI (E coli) - 3 doses of IV rocephin infused with plan for PO cipro for 4 additional days given hardware placement and incomplete treatment recently       Discharge To:      Dr. Sondra Olivera aware and agree with plan.      Signed By: Nadia Madera NP    Orthopedic Nurse Practitioner

## 2018-07-15 NOTE — PROGRESS NOTES
Bedside and Verbal shift change report given to Jarod Cisneros (oncoming nurse) by Chadd Hensley RN (offgoing nurse). Report included the following information SBAR, Kardex, Procedure Summary, Intake/Output, MAR, Accordion, Recent Results and Med Rec Status.

## 2018-07-16 NOTE — OP NOTES
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 POSTOPERATIVE DIAGNOSIS:  Dislocated right reverse total shoulder arthroplasty, possible infection. PROCEDURE PERFORMED:  Open reduction with revision of right reverse shoulder replacement and cultures sent x2. SURGEON:  Urbano Mckee MD    ASSISTANT:  SEAN Smith    COMPLICATIONS:  None. IMPLANTS:  New liner, 12 mm more buildup than before from RISPERIDONE.    ESTIMATED BLOOD LOSS:  30 mL    SPECIMENS REMOVED:  none    INDICATION:  This woman is 6 weeks out from a reverse arthroplasty and was carrying some towels and without falling had a dislocation of her shoulder. She presents to the office transferred from an outside hospital with a dislocated shoulder and she was brought into the hospital for comfort, pain control and posted for urgent revision. On preoperative evaluation, she was found to have a urinary tract infection and she was advised that her dislocation may be secondary to an infected prosthesis. DESCRIPTION OF PROCEDURE:  The patient was brought to the operating theater and given airway, IV antibiotics and preoperatively positioned on the operating table, prepped and draped and after time out I made a 3-inch incision in the deltopectoral interval and dissected down through the deltopectoral interval.  When I entered the shoulder joint, there was a copious amount of fluid which we sent for Gram stain, culture, cell count, aerobic and anaerobic. It did not appear grossly infected. We inspected the dislocated prosthesis and removed the polyethylene and trialed various sizes and ended up adding a 12 mm buildup which provided for good stability. We abraded all surfaces and copiously irrigated.   Obviously, if this turns out to be infected, we may have to come back for implant removal, but at this point she was revised, reduced, cultured and closed and taken to recovery room in stable condition.       MD Anabel Lim / Rishi Keane  D: 07/15/2018 17:29     T: 07/15/2018 22:22  JOB #: [id number]

## 2018-07-16 NOTE — DISCHARGE SUMMARY
Ortho Discharge Summary    Patient ID:  Augustine Capellan  [id number]  female  74 y.o.  1945    Admit date: 7/12/2018    Discharge date: 7/16/2018    Date of Surgery:   7/13/2018     Preoperative Diagnosis:  DISLOCATED RIGHT TOTAL SHOULDER    Postoperative Diagnosis:   DISLOCATED RIGHT TOTAL SHOULDER    Procedure(s):  RIGHT REVERSE SHOULDER ARTHROPLASTY REVISION     Anesthesia Type:   General     Surgeon: Leanne Olivier MD                            HPI:  Pt is a 74 y.o. female who has a history of DISLOCATED RIGHT TOTAL SHOULDER with pain and limitations of activities of daily living who presents at this time for a [address] following the failure of conservative management. PMH:   Past Medical History:   Diagnosis Date    Arthritis     Blind right eye 2017    Diabetes ([address]     Gall stones     h/o    GERD (gastroesophageal reflux disease)     High cholesterol     Hypertension     Insomnia     Migraines     Nausea & vomiting     Psychiatric disorder     depression       Medications upon admission :   Prior to Admission Medications   Prescriptions Last Dose Informant Patient Reported? Taking?   lisinopril-hydroCHLOROthiazide (PRINZIDE, ZESTORETIC) 10-12.5 mg per tablet 7/11/2018  Yes Yes   Sig: Take 1 Tab by mouth daily. omeprazole (PRILOSEC) 40 mg capsule 7/11/2018  Yes Yes   Sig: Take 40 mg by mouth daily as needed. sertraline (ZOLOFT) 100 mg tablet 7/12/2018 at Unknown time  Yes Yes   Sig: Take 50 mg by mouth two (2) times a day. simvastatin (ZOCOR) 20 mg tablet 7/12/2018 at pm  Yes Yes   Sig: Take 20 mg by mouth nightly. Facility-Administered Medications: None        Allergies:  No Known Allergies     Hospital Course: The patient underwent surgery. Complications:  None; patient tolerated the procedure well. Was taken to the PACU in stable condition and then transferred to the ortho floor.       Perioperative Antibiotics:  Ancef, Vanco    Postoperative Pain Management:   Oxycodone po     DVT Prophylaxis: SCD    Postoperative transfusions:    Number of units banked PRBCs =   none     Post Op complications: none    Hemoglobin at discharge:    Lab Results   Component Value Date/Time    HGB 11.1 (L) 2018 11:04 AM    INR 1.0 2018 11:04 AM       Dressing was changed on POD # 1. Incision - clean, dry and intact. No significant erythema or swelling. Neurovascular exam found to be within normal limits. Wound appears to be healing without any evidence of infection. Physical Therapy started on the day following surgery and participated in bed mobility, transfers and ambulation. Home exercises explained and demonstrated. Will begin formal PT after post op office visit. Gait:                      Discharged to: Home. Condition on Discharge:   good    Discharge instructions:  - Take pain medications as prescribed  - Resume pre hospital diet      - Discharge activity: activity as tolerated, perform home exercises as instructed  - Wound Care Keep wound clean and dry. See discharge instruction sheet.  - Staples or Sutures to be removed around 7-10 days after surgery at post op office visit            -Jacob Hawkins [address]     Discharge Medication List as of 7/15/2018 10:39 AM      START taking these medications    Details   senna-docusate (PERICOLACE) 8.6-50 mg per tablet Take 1 Tab by mouth two (2) times a day., Print, Disp-120 Tab, R-0      ciprofloxacin HCl (CIPRO) 250 mg tablet Take 2 Tabs by mouth every twelve (12) hours for 4 days. , Print, Disp-16 Tab, R-0         CONTINUE these medications which have NOT CHANGED    Details   lisinopril-hydroCHLOROthiazide (PRINZIDE, ZESTORETIC) 10-12.5 mg per tablet Take 1 Tab by mouth daily. , Historical Med      omeprazole (PRILOSEC) 40 mg capsule Take 40 mg by mouth daily as needed., Historical Med      sertraline (ZOLOFT) 100 mg tablet Take 50 mg by mouth two (2) times a day., Historical Med      simvastatin (ZOCOR) 20 mg tablet Take 20 mg by mouth nightly., Historical Med         STOP taking these medications       HYDROcodone-acetaminophen (NORCO) 7.5-325 mg per tablet Comments:   Reason for Stopping:         meloxicam (MOBIC) 15 mg tablet Comments:   Reason for Stopping:         traMADol (ULTRAM) 50 mg tablet Comments:   Reason for Stopping:         HYDROcodone-acetaminophen (NORCO) 7.5-325 mg per tablet Comments:   Reason for Stopping:         meloxicam (MOBIC) 15 mg tablet Comments:   Reason for Stopping:            per medical continuation form      -Follow up in office as scheduled, appointment card given. Signed:   Darby Anaya [place]    7/16/2018  9:58 AM

## 2018-07-27 LAB
BACTERIA SPEC CULT: NORMAL
BACTERIA SPEC CULT: NORMAL
GRAM STN SPEC: NORMAL
GRAM STN SPEC: NORMAL
SERVICE CMNT-IMP: NORMAL
SERVICE CMNT-IMP: NORMAL

## (undated) DEVICE — SUTURE VCRL SZ 2-0 L36IN ABSRB UD L36MM CT-1 1/2 CIR J945H

## (undated) DEVICE — NEEDLE HYPO 18GA L1.5IN PNK S STL HUB POLYPR SHLD REG BVL

## (undated) DEVICE — SUT ETHBND 2 30IN OS4 GRN --

## (undated) DEVICE — CATHETER IV 18GA L1.25IN FEP STR HUB INTROCAN SFTY

## (undated) DEVICE — COVER,TABLE,60X90,STERILE: Brand: MEDLINE

## (undated) DEVICE — SUTURE FIBERWIRE SZ 2 W/ TAPERED NEEDLE BLUE L38IN NONABSORB BLU L26.5MM 1/2 CIRCLE AR7200

## (undated) DEVICE — SOLUTION LACTATED RINGERS INJECTION USP

## (undated) DEVICE — CONTINU-FLO SOLUTION SET, 2 INJECTION SITES, MALE LUER LOCK ADAPTER WITH RETRACTABLE COLLAR, LARGE BORE STOPCOCK WITH ROTATING MALE LUER LOCK EXTENSION SET, 2 INJECTION SITES, MALE LUER LOCK ADAPTER WITH RETRACTABLE COLLAR: Brand: INTERLINK/CONTINU-FLO

## (undated) DEVICE — ZINACTIVE USE 2641837 CLIP LIG M BLU TI HRT SHP WIRE HORZ 600 PER BX

## (undated) DEVICE — FRAZIER SUCTION INSTRUMENT 12 FR W/CONTROL VENT & OBTURATOR: Brand: FRAZIER

## (undated) DEVICE — SOLUTION IRRIG 3000ML 0.9% SOD CHL FLX CONT 0797208] ICU MEDICAL INC]

## (undated) DEVICE — ABDOMINAL PAD: Brand: DERMACEA

## (undated) DEVICE — LIGHT HANDLE: Brand: DEVON

## (undated) DEVICE — ROCKER SWITCH PENCIL BLADE ELECTRODE, HOLSTER: Brand: EDGE

## (undated) DEVICE — DRAPE,REIN 53X77,STERILE: Brand: MEDLINE

## (undated) DEVICE — SYR 10ML LUER LOK 1/5ML GRAD --

## (undated) DEVICE — 3M™ TEGADERM™ TRANSPARENT FILM DRESSING FRAME STYLE, 1624W, 2-3/8 IN X 2-3/4 IN (6 CM X 7 CM), 100/CT 4CT/CASE: Brand: 3M™ TEGADERM™

## (undated) DEVICE — BANDAGE COMPR SELF ADH 5 YDX4 IN TAN STRL PREMIERPRO LF

## (undated) DEVICE — NEEDLE HYPO 21GA L1.5IN INTRAMUSCULAR S STL LATCH BVL UP

## (undated) DEVICE — COVER,MAYO STAND,STERILE: Brand: MEDLINE

## (undated) DEVICE — 3M™ IOBAN™ 2 ANTIMICROBIAL INCISE DRAPE 6651EZ: Brand: IOBAN™ 2

## (undated) DEVICE — TAPE DSG RETEN W4INXL10YD NONWOVEN COMFORTABLE H2O RESIST

## (undated) DEVICE — OPTIVAC KIT DOUBLE MIX 80GM: Brand: DJO SURGICAL

## (undated) DEVICE — 2108 SERIES SAGITTAL BLADE (24.9 X 0.64 X 73.8MM)

## (undated) DEVICE — GAUZE SPONGES,12 PLY: Brand: CURITY

## (undated) DEVICE — GOWN,SIRUS,FABRNF,XL,20/CS: Brand: MEDLINE

## (undated) DEVICE — SET 2ND L34IN N DEHP THE QUEENS MED CNTR VALUELINK

## (undated) DEVICE — HANDPIECE SET WITH COAXIAL HIGH FLOW TIP AND SUCTION TUBE: Brand: INTERPULSE

## (undated) DEVICE — (D)PREP SKN CHLRAPRP APPL 26ML -- CONVERT TO ITEM 371833

## (undated) DEVICE — INFECTION CONTROL KIT SYS

## (undated) DEVICE — 4-PORT MANIFOLD: Brand: NEPTUNE 2

## (undated) DEVICE — SURGICAL PROCEDURE PACK BASIN MAJ SET CUST NO CAUT

## (undated) DEVICE — Z CONVERTED USE 2107985 COVER FLROSCP W36XL28IN 4 SIDE ADH

## (undated) DEVICE — NON-ADHERENT STRIPS,OIL EMULSION: Brand: CURITY

## (undated) DEVICE — STERILE POLYISOPRENE POWDER-FREE SURGICAL GLOVES: Brand: PROTEXIS

## (undated) DEVICE — SPONGE LAP 18X18IN STRL -- 5/PK

## (undated) DEVICE — REM POLYHESIVE ADULT PATIENT RETURN ELECTRODE: Brand: VALLEYLAB

## (undated) DEVICE — SUT VCRL 2-0 54IN UD --

## (undated) DEVICE — SOLUTION IRRIG 1000ML H2O STRL BLT

## (undated) DEVICE — TIP SUCT CRV REG REDI

## (undated) DEVICE — TOWEL SURG W17XL27IN STD BLU COT NONFENESTRATED PREWASHED

## (undated) DEVICE — PACK,SHOULDER II,DRAPE: Brand: MEDLINE

## (undated) DEVICE — AIRLIFE™ CORRUGATED FLEXIBLE POLYETHYLENE AND EVA TUBING FOR AEROSOL AND IPPB USE, SEGMENTED, 6 FEET (1.8 M) LENGTH, 22 MM I.D.: Brand: AIRLIFE™

## (undated) DEVICE — KENDALL DL ECG CABLE AND LEAD WIRE SYSTEM, 3-LEAD, SINGLE PATIENT USE: Brand: KENDALL

## (undated) DEVICE — KIT ADAP STERILE WATER 1000ML -- AIRLIFE

## (undated) DEVICE — INTENDED FOR TISSUE SEPARATION, AND OTHER PROCEDURES THAT REQUIRE A SHARP SURGICAL BLADE TO PUNCTURE OR CUT.: Brand: BARD-PARKER ® CARBON RIB-BACK BLADES

## (undated) DEVICE — CONVERTORS STOCKINETTE: Brand: CONVERTORS

## (undated) DEVICE — 3M™ IOBAN™ 2 ANTIMICROBIAL INCISE DRAPE 6640EZ: Brand: IOBAN™ 2

## (undated) DEVICE — AIRLIFE™ FACE TENT MASK VINYL: Brand: AIRLIFE™

## (undated) DEVICE — SUTURE VCRL SZ 0 L27IN ABSRB UD L36MM CT-1 1/2 CIR J260H

## (undated) DEVICE — SLIM BODY SKIN STAPLER: Brand: APPOSE ULC